# Patient Record
Sex: MALE | Race: WHITE | NOT HISPANIC OR LATINO | URBAN - NONMETROPOLITAN AREA
[De-identification: names, ages, dates, MRNs, and addresses within clinical notes are randomized per-mention and may not be internally consistent; named-entity substitution may affect disease eponyms.]

---

## 2017-05-17 ENCOUNTER — HOSPITAL ENCOUNTER (INPATIENT)
Facility: HOSPITAL | Age: 42
LOS: 6 days | Discharge: HOME | DRG: 885 | End: 2017-05-23
Attending: PSYCHIATRY & NEUROLOGY | Admitting: PSYCHIATRY & NEUROLOGY
Payer: COMMERCIAL

## 2017-05-17 ENCOUNTER — TRANSFERRED RECORDS (OUTPATIENT)
Dept: HEALTH INFORMATION MANAGEMENT | Facility: HOSPITAL | Age: 42
End: 2017-05-17

## 2017-05-17 DIAGNOSIS — I10 BENIGN ESSENTIAL HYPERTENSION: ICD-10-CM

## 2017-05-17 DIAGNOSIS — T14.91XA SUICIDE ATTEMPT (H): Primary | ICD-10-CM

## 2017-05-17 LAB
ALT SERPL-CCNC: 29 IU/L (ref 6–40)
AST SERPL-CCNC: 21 IU/L (ref 10–40)
CREAT SERPL-MCNC: 0.96 MG/DL (ref 0.7–1.2)
GLUCOSE SERPL-MCNC: 115 MG/DL (ref 70–100)
POTASSIUM SERPL-SCNC: 3.8 MEQ/L (ref 3.4–5.1)
TSH SERPL-ACNC: 0.92 UIU/ML (ref 0.4–3.99)

## 2017-05-17 PROCEDURE — 12400000 ZZH R&B MH

## 2017-05-17 PROCEDURE — 25000132 ZZH RX MED GY IP 250 OP 250 PS 637: Performed by: NURSE PRACTITIONER

## 2017-05-17 RX ORDER — ALUMINA, MAGNESIA, AND SIMETHICONE 2400; 2400; 240 MG/30ML; MG/30ML; MG/30ML
30 SUSPENSION ORAL EVERY 4 HOURS PRN
Status: DISCONTINUED | OUTPATIENT
Start: 2017-05-17 | End: 2017-05-23 | Stop reason: HOSPADM

## 2017-05-17 RX ORDER — TRAZODONE HYDROCHLORIDE 50 MG/1
50 TABLET, FILM COATED ORAL
Status: DISCONTINUED | OUTPATIENT
Start: 2017-05-17 | End: 2017-05-22

## 2017-05-17 RX ORDER — ACETAMINOPHEN 325 MG/1
650 TABLET ORAL EVERY 4 HOURS PRN
Status: DISCONTINUED | OUTPATIENT
Start: 2017-05-17 | End: 2017-05-23 | Stop reason: HOSPADM

## 2017-05-17 RX ORDER — CLONIDINE HYDROCHLORIDE 0.1 MG/1
0.1 TABLET ORAL 2 TIMES DAILY
Status: DISCONTINUED | OUTPATIENT
Start: 2017-05-17 | End: 2017-05-23 | Stop reason: HOSPADM

## 2017-05-17 RX ORDER — ONDANSETRON 4 MG/1
4 TABLET, ORALLY DISINTEGRATING ORAL EVERY 6 HOURS PRN
Status: DISCONTINUED | OUTPATIENT
Start: 2017-05-17 | End: 2017-05-23 | Stop reason: HOSPADM

## 2017-05-17 RX ORDER — HYDROXYZINE HYDROCHLORIDE 25 MG/1
25-50 TABLET, FILM COATED ORAL EVERY 4 HOURS PRN
Status: DISCONTINUED | OUTPATIENT
Start: 2017-05-17 | End: 2017-05-23 | Stop reason: HOSPADM

## 2017-05-17 RX ORDER — OLANZAPINE 10 MG/2ML
10 INJECTION, POWDER, FOR SOLUTION INTRAMUSCULAR
Status: DISCONTINUED | OUTPATIENT
Start: 2017-05-17 | End: 2017-05-23 | Stop reason: HOSPADM

## 2017-05-17 RX ORDER — LISINOPRIL 20 MG/1
20 TABLET ORAL DAILY
Status: DISCONTINUED | OUTPATIENT
Start: 2017-05-17 | End: 2017-05-23 | Stop reason: HOSPADM

## 2017-05-17 RX ORDER — BISACODYL 10 MG
10 SUPPOSITORY, RECTAL RECTAL DAILY PRN
Status: DISCONTINUED | OUTPATIENT
Start: 2017-05-17 | End: 2017-05-23 | Stop reason: HOSPADM

## 2017-05-17 RX ORDER — OLANZAPINE 10 MG/1
10 TABLET ORAL
Status: DISCONTINUED | OUTPATIENT
Start: 2017-05-17 | End: 2017-05-23 | Stop reason: HOSPADM

## 2017-05-17 RX ADMIN — LISINOPRIL 20 MG: 20 TABLET ORAL at 20:36

## 2017-05-17 RX ADMIN — OLANZAPINE 10 MG: 10 TABLET, FILM COATED ORAL at 17:51

## 2017-05-17 RX ADMIN — CLONIDINE HYDROCHLORIDE 0.1 MG: 0.1 TABLET ORAL at 20:36

## 2017-05-17 ASSESSMENT — ACTIVITIES OF DAILY LIVING (ADL)
SWALLOWING: 0-->SWALLOWS FOODS/LIQUIDS WITHOUT DIFFICULTY
CHANGE_IN_FUNCTIONAL_STATUS_SINCE_ONSET_OF_CURRENT_ILLNESS/INJURY: NO
TOILETING: 0-->INDEPENDENT
FALL_HISTORY_WITHIN_LAST_SIX_MONTHS: NO
AMBULATION: 0-->INDEPENDENT
TOILETING: 0-->INDEPENDENT
BATHING: 0-->INDEPENDENT
COGNITION: 0 - NO COGNITION ISSUES REPORTED
EATING: 0-->INDEPENDENT
RETIRED_COMMUNICATION: 0-->UNDERSTANDS/COMMUNICATES WITHOUT DIFFICULTY
AMBULATION: 0-->INDEPENDENT
DRESS: 0-->INDEPENDENT
TRANSFERRING: 0-->INDEPENDENT
COMMUNICATION: 0-->UNDERSTANDS/COMMUNICATES WITHOUT DIFFICULTY
SWALLOWING: 0-->SWALLOWS FOODS/LIQUIDS WITHOUT DIFFICULTY
PRIOR_FUNCTIONAL_LEVEL_COMMENT: NONE NOTED
TRANSFERRING: 0-->INDEPENDENT
RETIRED_EATING: 0-->INDEPENDENT
DRESS: 0-->INDEPENDENT
CURRENT_FUNCTIONAL_LEVEL_COMMENT: NONE NOTED
BATHING: 0-->INDEPENDENT

## 2017-05-17 NOTE — IP AVS SNAPSHOT
[facility]    [address] [place] [address]    Phone:  [phone]    Fax:  [phone]                                       After Visit Summary   5/17/2017    Joel Villalba    MRN: [id number]           After Visit Summary Signature Page     I have received my discharge instructions, and my questions have been answered. I have discussed any challenges I see with this plan with the nurse or doctor.    ..........................................................................................................................................  Patient/Patient Representative Signature      ..........................................................................................................................................  Patient Representative Print Name and Relationship to Patient    ..................................................               ................................................  Date                                            Time    ..........................................................................................................................................  Reviewed by Signature/Title    ...................................................              ..............................................  Date                                                            Time

## 2017-05-17 NOTE — PROGRESS NOTES
05/17/17 1731   Patient Belongings   Did you bring any home meds/supplements to the hospital?  No   Patient Belongings clothing;shoes   Disposition of Belongings sent to patient belongings   Belongings Search Yes   Clothing Search Yes   Second Staff Magaly LEMOS   General Info Comment green nike shoes, white pair socks, blue boxers, grey zip up hoodie, green under armour shorts, green under armour shirt, blue jeans

## 2017-05-17 NOTE — PROGRESS NOTES
05/17/17 1731   Patient Belongings   Did you bring any home meds/supplements to the hospital?  No   Patient Belongings clothing;shoes   Disposition of Belongings sent to patient belongings   Belongings Search Yes   Clothing Search Yes   Second Staff Magaly LEMOS   General Info Comment green nike shoes, white pair socks, blue boxers, grey zip up hoodie, green under armour shorts, green under armour shirt, blue jeans    List items sent to safe: none  All other belongings put in assigned cubby in belongings room.     I have reviewed my belongings list on admission and verify that it is correct.     Patient signature_______________________________    Second staff witness (if patient unable to sign) ______________________________       I have received all my belongings at discharge.    Patient signature________________________________    Cinthia JOINER  5/17/2017  5:35 PM

## 2017-05-17 NOTE — PLAN OF CARE
"ADMISSION NOTE    Reason for admission: Suicide Ideation.  Safety concerns: Self harm, patient reports \"laying down on the train tracks waiting for one to come\". .  Risk for or history of violence: None Noted.     Patient arrived on unit from [facility] in [place], [place] accompanied by EMT's and [place] security on 5/17/2017 at 16:55 PM.   Status on arrival: Anxious and Cooperative.   BP (!) 148/107  Pulse 102  Temp 97.3  F (36.3  C) (Tympanic)  Resp 16  Ht 1.702 m (5' 7\")  Wt 97 kg (213 lb 12.8 oz)  BMI 33.49 kg/m2  Patient given tour of unit and Welcome to  unit papers given to patient, wanding completed, belongings inventoried, and admission assessment initiated.   Patient's legal status on arrival is 72 hour hold. Appropriate legal rights discussed with and copy given to patient. Patient Bill of Rights discussed with and copy given to patient.   Patient denies SI, HI, and thoughts of self harm and contracts for safety while on unit. Full skin assessment completed.    Porsha Harrison  5/17/2017  5:40 PM      "

## 2017-05-17 NOTE — IP AVS SNAPSHOT
MRN:[id number]                      After Visit Summary   5/17/2017    Jeol Villalba    MRN: [id number]           Thank you!     Thank you for choosing [facility] for your care. Our goal is always to provide you with excellent care. Hearing back from our patients is one way we can continue to improve our services. Please take a few minutes to complete the written survey that you may receive in the mail after you visit with us. Thank you!        Patient Information     Date Of Birth          1975        Designated Caregiver       Most Recent Value    Caregiver    Will someone help with your care after discharge? no      About your hospital stay     You were admitted on:  May 17, 2017 You last received care in the:  [facility]    You were discharged on:  May 23, 2017       Who to Call     For medical emergencies, please call 911.  For non-urgent questions about your medical care, please call your primary care provider or clinic, [phone]          Attending Provider     Provider Specialty    Ethan Coats MD Psychiatry    Mundo Hassan NP Licensed Mental [facility]       Primary Care Provider Office Phone # Fax #    Casey Ethan Ellis [phone]       [facility] [address] [place] [address]        Further instructions from your care team       Behavioral Discharge Planning and Instructions    Summary: Joel was admitted to  with suicidal ideation     Main Diagnosis: Major depressive disorder with psychotic features      Major Treatments, Procedures and Findings: Stabilize with medications, connect with community programs.    Symptoms to Report: feeling more aggressive, increased confusion, losing more sleep, mood getting worse or thoughts of suicide    Lifestyle Adjustment: Take all medications as prescribed, meet with doctor/ medication provider, out patient therapist, , and [facility] worker as scheduled. Abstain from alcohol  or any unprescribed drugs.    Psychiatry Follow-up:     [facility]  PCP- Casey Ellis -  June 1st @ 10:30   [address] [place] [address]  Phone: [phone]  Fax: [phone]    [facility]   Therapy - Elham Kohler -  June 5th @ 1:30 (bring insurance card and photo ID)   [facility] - referral faxed on 5/19/2017  [address],   [place] [address]   Phone: [phone]  Fax: [phone]    [facility]  Case management - referral faxed on 5/18/2017   Phone: [phone]  Fax: [phone]    Resources:   Crisis Intervention: 835.248.1668 or 935-931-9032 (TTY: 833.436.4201).  Call anytime for help.  National [place] on [place]ness (www.[place].isaías.org): 191.135.8915 or 802-958-8982.  Alcoholics Anonymous (www.alcoholics-anonymous.org): Check your phone book for your local chapter.  Suicide Awareness Voices of Education (SAVE) (www.save.org): 488-671-EWWI (2675)  National Suicide Prevention Line (www.mentalhealthmn.org): 284-888-CGZK (1793)  Mental Health Consumer/Survivor Network of [place] (www.mhcsn.net): 955.671.9349 or 320-836-7270  Mental Health Association of [place] (www.mentalhealth.org): 659.531.3536 or 839-149-1027    General Medication Instructions:   See your medication sheet(s) for instructions.   Take all medicines as directed.  Make no changes unless your doctor suggests them.   Go to all your doctor visits.  Be sure to have all your required lab tests. This way, your medicines can be refilled on time.  Do not use any drugs not prescribed by your doctor.  Avoid alcohol.    [place] Area:  [facility], [facility] stabilization [facility]- [phone]  formerly [facility] Crisis Line: 1-494.956.2787  Advocates For Family Peace: 052-8661  Sexual Assault Program [facility]: [phone] or 4-740-740-2325  [place] Battered Women's Program: 9-481-992-5099 Ext: 279       Calls answered Mon-Fri-8:00 am--4:30 pm    [place]:  Advocates for Family Peace: 4-583-990-2141  [place]  "[facility] first call for help: 4-495-017-9652  [facility] Crisis [facility]:  (761) [phone]      [place] Area:  Warm Line: 1-737.929.4221       Calls answered Tuesday--Saturday 4:00 pm--10:00 pm  [facility] Crisis Line - [phone]  [facility] [phone]    [place] Statewide:  [place] Crisis and Referral Services: 1-369.536.6753  National Suicide Prevention Lifeline: 8-620-656-MEGB (8720)   - uem6jyru- Text  Life  to [address]  First Call for Help:   [facility] Helpline- 5-354-BNMQ-HELP      Pending Results     No orders found from 5/15/2017 to 2017.            Statement of Approval     Ordered          17  I have reviewed and agree with all the recommendations and orders detailed in this document.  EFFECTIVE NOW     Approved and electronically signed by:  Thea Mcfadden NP             Admission Information     Date & Time Provider Department Dept. Phone    2017 Mundo Hassan NP [facility] [phone]      Your Vitals Were     Blood Pressure Pulse Temperature Respirations Height Weight    160/86 88 97.2  F (36.2  C) (Tympanic) 15 1.676 m (5' 6\") 97.7 kg (215 lb 4.8 oz)    Pulse Oximetry BMI (Body Mass Index)                93% 34.75 kg/m2          MyChart Information     [facility] lets you send messages to your doctor, view your test results, renew your prescriptions, schedule appointments and more. To sign up, go to [email or web address] . Click on \"Log in\" on the left side of the screen, which will take you to the Welcome page. Then click on \"Sign up Now\" on the right side of the page.     You will be asked to enter the access code listed below, as well as some personal information. Please follow the directions to create your username and password.     Your access code is: [email or web address]  Expires: 2017  8:57 AM     Your access code will  in 90 days. If you need help or a new code, please call your [place] clinic or [phone].        Care " EveryWhere ID     This is your Care EveryWhere ID. This could be used by other organizations to access your [facility] medical records  [id number]           Review of your medicines      START taking        Dose / Directions    celecoxib 100 MG capsule   Commonly known as:  celeBREX        Dose:  100 mg   Take 1 capsule (100 mg) by mouth 2 times daily   Quantity:  60 capsule   Refills:  0       divalproex 500 MG 24 hr tablet   Commonly known as:  DEPAKOTE ER        Dose:  1000 mg   Take 2 tablets (1,000 mg) by mouth At Bedtime   Quantity:  60 tablet   Refills:  0       hydrOXYzine 25 MG tablet   Commonly known as:  ATARAX        Dose:  25-50 mg   Take 1-2 tablets (25-50 mg) by mouth every 4 hours as needed for anxiety   Quantity:  120 tablet   Refills:  0       metaxalone 800 MG tablet   Commonly known as:  SKELAXIN        Dose:  800 mg   Take 1 tablet (800 mg) by mouth 3 times daily   Quantity:  90 tablet   Refills:  0       risperiDONE 1 MG tablet   Commonly known as:  risperDAL        Dose:  1 mg   Take 1 tablet (1 mg) by mouth At Bedtime   Quantity:  30 tablet   Refills:  0         CONTINUE these medicines which may have CHANGED, or have new prescriptions. If we are uncertain of the size of tablets/capsules you have at home, strength may be listed as something that might have changed.        Dose / Directions    cloNIDine 0.1 MG tablet   Commonly known as:  CATAPRES   This may have changed:  medication strength        Dose:  0.1 mg   Take 1 tablet (0.1 mg) by mouth 2 times daily   Quantity:  60 tablet   Refills:  0       lisinopril 20 MG tablet   Commonly known as:  PRINIVIL/ZESTRIL   This may have changed:  medication strength   Used for:  Benign essential hypertension        Dose:  20 mg   Take 1 tablet (20 mg) by mouth daily   Quantity:  30 tablet   Refills:  0         CONTINUE these medicines which have NOT CHANGED        Dose / Directions    fluticasone 27.5 MCG/SPRAY spray   Commonly known as:  VERAMYST         Dose:  2 spray   Spray 2 sprays into both nostrils daily   Refills:  0       MECLIZINE HCL PO        Dose:  25 mg   Take 25 mg by mouth 3 times daily as needed for dizziness   Refills:  0       MELATONIN PO        Dose:  5 mg   Take 5 mg by mouth At Bedtime   Refills:  0       OMEPRAZOLE PO        Dose:  20 mg   Take 20 mg by mouth daily   Refills:  0         STOP taking     CYMBALTA PO           NAPROXEN PO           TRAZODONE HCL PO                Where to get your medicines      These medications were sent to [facility] - [facility] - [address]  [address] [place] [address]     Phone:  [phone]     celecoxib 100 MG capsule    cloNIDine 0.1 MG tablet    divalproex 500 MG 24 hr tablet    hydrOXYzine 25 MG tablet    lisinopril 20 MG tablet    metaxalone 800 MG tablet    risperiDONE 1 MG tablet                Protect others around you: Learn how to safely use, store and throw away your medicines at www.disposemymeds.org.             Medication List: This is a list of all your medications and when to take them. Check marks below indicate your daily home schedule. Keep this list as a reference.      Medications           Morning Afternoon Evening Bedtime As Needed    celecoxib 100 MG capsule   Commonly known as:  celeBREX   Take 1 capsule (100 mg) by mouth 2 times daily   Last time this was given:  100 mg on 5/23/2017  8:33 AM                                cloNIDine 0.1 MG tablet   Commonly known as:  CATAPRES   Take 1 tablet (0.1 mg) by mouth 2 times daily   Last time this was given:  0.1 mg on 5/23/2017  8:33 AM                                divalproex 500 MG 24 hr tablet   Commonly known as:  DEPAKOTE ER   Take 2 tablets (1,000 mg) by mouth At Bedtime   Last time this was given:  1,000 mg on 5/22/2017  8:22 PM                                fluticasone 27.5 MCG/SPRAY spray   Commonly known as:  VERAMYST   Spray 2 sprays into both nostrils daily                                 hydrOXYzine 25 MG tablet   Commonly known as:  ATARAX   Take 1-2 tablets (25-50 mg) by mouth every 4 hours as needed for anxiety                                lisinopril 20 MG tablet   Commonly known as:  PRINIVIL/ZESTRIL   Take 1 tablet (20 mg) by mouth daily   Last time this was given:  20 mg on 5/23/2017  8:33 AM                                MECLIZINE HCL PO   Take 25 mg by mouth 3 times daily as needed for dizziness                                MELATONIN PO   Take 5 mg by mouth At Bedtime                                metaxalone 800 MG tablet   Commonly known as:  SKELAXIN   Take 1 tablet (800 mg) by mouth 3 times daily   Last time this was given:  800 mg on 5/23/2017  8:33 AM                                OMEPRAZOLE PO   Take 20 mg by mouth daily   Last time this was given:  20 mg on 5/23/2017  6:46 AM                                risperiDONE 1 MG tablet   Commonly known as:  risperDAL   Take 1 tablet (1 mg) by mouth At Bedtime   Last time this was given:  1 mg on 5/22/2017  8:22 PM

## 2017-05-18 PROCEDURE — 25000132 ZZH RX MED GY IP 250 OP 250 PS 637: Performed by: NURSE PRACTITIONER

## 2017-05-18 PROCEDURE — 12400000 ZZH R&B MH

## 2017-05-18 PROCEDURE — 99223 1ST HOSP IP/OBS HIGH 75: CPT | Performed by: NURSE PRACTITIONER

## 2017-05-18 RX ORDER — FLUTICASONE PROPIONATE 50 MCG
2 SPRAY, SUSPENSION (ML) NASAL DAILY
Status: DISCONTINUED | OUTPATIENT
Start: 2017-05-18 | End: 2017-05-23 | Stop reason: HOSPADM

## 2017-05-18 RX ORDER — METAXALONE 800 MG/1
800 TABLET ORAL 3 TIMES DAILY
Status: DISCONTINUED | OUTPATIENT
Start: 2017-05-18 | End: 2017-05-23 | Stop reason: HOSPADM

## 2017-05-18 RX ORDER — CELECOXIB 100 MG/1
100 CAPSULE ORAL 2 TIMES DAILY
Status: DISCONTINUED | OUTPATIENT
Start: 2017-05-18 | End: 2017-05-23 | Stop reason: HOSPADM

## 2017-05-18 RX ORDER — MECLIZINE HYDROCHLORIDE 25 MG/1
25 TABLET ORAL 3 TIMES DAILY PRN
Status: DISCONTINUED | OUTPATIENT
Start: 2017-05-18 | End: 2017-05-23 | Stop reason: HOSPADM

## 2017-05-18 RX ORDER — LIDOCAINE 50 MG/G
2 PATCH TOPICAL EVERY 24 HOURS
Status: DISCONTINUED | OUTPATIENT
Start: 2017-05-18 | End: 2017-05-23 | Stop reason: HOSPADM

## 2017-05-18 RX ORDER — DULOXETIN HYDROCHLORIDE 60 MG/1
60 CAPSULE, DELAYED RELEASE ORAL 2 TIMES DAILY
Status: DISCONTINUED | OUTPATIENT
Start: 2017-05-18 | End: 2017-05-19

## 2017-05-18 RX ORDER — RISPERIDONE 1 MG/1
1 TABLET ORAL AT BEDTIME
Status: DISCONTINUED | OUTPATIENT
Start: 2017-05-18 | End: 2017-05-23 | Stop reason: HOSPADM

## 2017-05-18 RX ADMIN — LIDOCAINE 2 PATCH: 50 PATCH TOPICAL at 12:05

## 2017-05-18 RX ADMIN — METAXALONE 800 MG: 800 TABLET ORAL at 13:39

## 2017-05-18 RX ADMIN — CELECOXIB 100 MG: 100 CAPSULE ORAL at 20:31

## 2017-05-18 RX ADMIN — DULOXETINE HYDROCHLORIDE 60 MG: 60 CAPSULE, DELAYED RELEASE ORAL at 10:49

## 2017-05-18 RX ADMIN — METAXALONE 800 MG: 800 TABLET ORAL at 10:49

## 2017-05-18 RX ADMIN — LISINOPRIL 20 MG: 20 TABLET ORAL at 08:03

## 2017-05-18 RX ADMIN — OLANZAPINE 10 MG: 10 TABLET, FILM COATED ORAL at 09:21

## 2017-05-18 RX ADMIN — CLONIDINE HYDROCHLORIDE 0.1 MG: 0.1 TABLET ORAL at 08:03

## 2017-05-18 RX ADMIN — RISPERIDONE 1 MG: 1 TABLET ORAL at 20:31

## 2017-05-18 RX ADMIN — CLONIDINE HYDROCHLORIDE 0.1 MG: 0.1 TABLET ORAL at 20:31

## 2017-05-18 RX ADMIN — METAXALONE 800 MG: 800 TABLET ORAL at 20:31

## 2017-05-18 RX ADMIN — OMEPRAZOLE 20 MG: 20 CAPSULE, DELAYED RELEASE ORAL at 10:49

## 2017-05-18 RX ADMIN — DULOXETINE HYDROCHLORIDE 60 MG: 60 CAPSULE, DELAYED RELEASE ORAL at 20:31

## 2017-05-18 RX ADMIN — CELECOXIB 100 MG: 100 CAPSULE ORAL at 12:04

## 2017-05-18 ASSESSMENT — ACTIVITIES OF DAILY LIVING (ADL)
ORAL_HYGIENE: INDEPENDENT
GROOMING: INDEPENDENT
DRESS: INDEPENDENT
ORAL_HYGIENE: INDEPENDENT
DRESS: INDEPENDENT;SCRUBS (BEHAVIORAL HEALTH)
GROOMING: INDEPENDENT
LAUNDRY: UNABLE TO COMPLETE
LAUNDRY: UNABLE TO COMPLETE

## 2017-05-18 NOTE — PLAN OF CARE
Face to face end of shift report received from CHELSY Neal. Rounding completed. Patient observed.     Niya Rhoades  5/18/2017  6:40 PM
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
## 2017-05-18 NOTE — PLAN OF CARE
Face to face end of shift report received from Aiden MANCILLA RN. Rounding completed. Patient observed in room laying in bed with eyes closed.     Val Grant  5/18/2017  7:40 AM
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
## 2017-05-18 NOTE — PLAN OF CARE
Face to face end of shift report received from Porsha RUTH RN. Rounding completed. Patient observed resting in bed.     Aiden Pittman  5/18/2017  12:41 AM

## 2017-05-18 NOTE — PLAN OF CARE
Problem: Goal Outcome Summary  Goal: Goal Outcome Summary  Outcome: Improving  Pt has been in bed with eyes closed and regular respirations observed all night. Will continue to monitor.
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
## 2017-05-19 PROCEDURE — 25000132 ZZH RX MED GY IP 250 OP 250 PS 637: Performed by: NURSE PRACTITIONER

## 2017-05-19 PROCEDURE — 99233 SBSQ HOSP IP/OBS HIGH 50: CPT | Performed by: NURSE PRACTITIONER

## 2017-05-19 PROCEDURE — 12400000 ZZH R&B MH

## 2017-05-19 RX ORDER — DIVALPROEX SODIUM 500 MG/1
500 TABLET, EXTENDED RELEASE ORAL AT BEDTIME
Status: COMPLETED | OUTPATIENT
Start: 2017-05-19 | End: 2017-05-19

## 2017-05-19 RX ORDER — DULOXETIN HYDROCHLORIDE 60 MG/1
60 CAPSULE, DELAYED RELEASE ORAL DAILY
Status: COMPLETED | OUTPATIENT
Start: 2017-05-20 | End: 2017-05-22

## 2017-05-19 RX ADMIN — CLONIDINE HYDROCHLORIDE 0.1 MG: 0.1 TABLET ORAL at 08:36

## 2017-05-19 RX ADMIN — LISINOPRIL 20 MG: 20 TABLET ORAL at 08:36

## 2017-05-19 RX ADMIN — LIDOCAINE 2 PATCH: 50 PATCH TOPICAL at 10:00

## 2017-05-19 RX ADMIN — CELECOXIB 100 MG: 100 CAPSULE ORAL at 20:57

## 2017-05-19 RX ADMIN — NICOTINE POLACRILEX 4 MG: 4 GUM, CHEWING ORAL at 08:42

## 2017-05-19 RX ADMIN — CELECOXIB 100 MG: 100 CAPSULE ORAL at 08:36

## 2017-05-19 RX ADMIN — RISPERIDONE 1 MG: 1 TABLET ORAL at 20:53

## 2017-05-19 RX ADMIN — CLONIDINE HYDROCHLORIDE 0.1 MG: 0.1 TABLET ORAL at 20:53

## 2017-05-19 RX ADMIN — METAXALONE 800 MG: 800 TABLET ORAL at 13:08

## 2017-05-19 RX ADMIN — DIVALPROEX SODIUM 500 MG: 500 TABLET, EXTENDED RELEASE ORAL at 20:52

## 2017-05-19 RX ADMIN — OMEPRAZOLE 20 MG: 20 CAPSULE, DELAYED RELEASE ORAL at 06:33

## 2017-05-19 RX ADMIN — METAXALONE 800 MG: 800 TABLET ORAL at 20:53

## 2017-05-19 RX ADMIN — ACETAMINOPHEN 650 MG: 325 TABLET, FILM COATED ORAL at 06:37

## 2017-05-19 RX ADMIN — DULOXETINE HYDROCHLORIDE 60 MG: 60 CAPSULE, DELAYED RELEASE ORAL at 08:36

## 2017-05-19 RX ADMIN — METAXALONE 800 MG: 800 TABLET ORAL at 08:36

## 2017-05-19 ASSESSMENT — ACTIVITIES OF DAILY LIVING (ADL)
LAUNDRY: UNABLE TO COMPLETE
ORAL_HYGIENE: INDEPENDENT
DRESS: INDEPENDENT;SCRUBS (BEHAVIORAL HEALTH)
ORAL_HYGIENE: INDEPENDENT
GROOMING: INDEPENDENT
GROOMING: INDEPENDENT
DRESS: INDEPENDENT;SCRUBS (BEHAVIORAL HEALTH)
LAUNDRY: UNABLE TO COMPLETE

## 2017-05-19 NOTE — PLAN OF CARE
Face to face end of shift report received from CHELSY Neal. Rounding completed. Patient observed in [facility].     Niya Rhoades  5/19/2017  4:20 PM

## 2017-05-19 NOTE — PLAN OF CARE
"Problem: Goal Outcome Summary  Goal: Goal Outcome Summary  Outcome: Improving  Pt up in the lounge this morning upon initial assessment. Pt states he slept \"OK, tossing and turning, up and down, throughout the night d/t back pain.\" Pt continues to report depression 7/10, and anxiety 2/10. Pt states he is able to cope with anxiety at this time sitting in room relaxing. Pt did get up for breakfast then back in bed laying down. Pt will be encouraged to attend groups. Pt does maintain boundaries and eye contact throughout assessment. PT also maintains boundaries with peers.     Problem: Depression (Adult,Obstetrics,Pediatric)  Goal: Establish/Maintain Self-Care Routine  Patient will attend at least 50% of groups while on the unit.   Patient will be independent in daily ADL s.   Outcome: Improving  Pt completes ADL's independently at this time, pt is not attending groups thus far this morning.  Goal: Improved/Stable Mood  Patient will be compliant with treatment team recommendations.   Patient will verbalize decrease in depression prior to discharge.   Outcome: Improving  Pt compliant with treatment team taking medications prescribed but does not attend groups at this time. Pt continues to report depression 7/10    Problem: Anxiety (Adult)  Goal: Reduction/Resolution  Patient will come to staff if needing PRN medications for anxiety.   Patient will verbalize at least 2 effective coping mechanisms prior to discharge.   Patient will verbalize decrease in anxiety prior to discharge.   Outcome: Improving  Pt does state when he would like a PRN for anxiety, Pt currently states his anxiety is a 2/10 and is currently controllable without medications    Problem: Suicide Risk (Adult)  Goal: Strength-Based Wellness/Recovery  Patient will attend at least 50% of groups while on unit.   Outcome: No Change  Pt up for breakfast then back in bed after taking morning medications.   Goal: Physical Safety  Patient will contract for safety " while on unit if having thoughts of self harm.   Patient will report absence of suicidal ideations prior to discharge.   Outcome: Improving  Pt contracts for safety, pt is free from self harm. Pt denies SI at this time.

## 2017-05-19 NOTE — PLAN OF CARE
"Problem: Discharge Planning  Goal: Discharge Planning (Adult, OB, Behavioral, Peds)  Outcome: No Change  Spoke with pt this afternoon, he was laying in bed. States he was doing okay until change of shift when it \"sounded like [place].\" Denies SI. States he just wants to be out and riding on his motorcycle. Is worried about the bill he is gong to get from having his truck towed. Pt asked for 's number yesterday so gave this to pt today- he states \"I didn't want want the number I want someone to call and explain my case with my brother.\" Informed pt he would need to make this call and explain his own case- he then states he already did this awhile ago and they are reviewing it. No other questions or concerns at this time.       "

## 2017-05-19 NOTE — PLAN OF CARE
Problem: Goal Outcome Summary  Goal: Goal Outcome Summary  Outcome: Improving  Pt has been in bed with eyes closed and regular respirations observed all night. Will continue to monitor.  0650- Pt received PRN tylenol for knee and lower back pain pain rated at a 5 out of 10.

## 2017-05-19 NOTE — PROGRESS NOTES
"[facility]  Psychiatric Progress Note      Impression:   This is my first time meeting with Joel. H&P was reviewed. He states he slept 8 hours last night though he is extremely pressured. He interrupts me through most of the conversation. He states that he has questioned if he is bipolar and states \"my girlfriend tells me that i get very manic.\" he started hearing voices last year. They are always negative. Laughing at him. He has had VH. He states that he hasnt had any AH or VH since yesterday. He states  \"they occur more when i get a bit tired\". They do not occur when he is falling asleep. His mother had a diagnosis of schizophrenia though when he describes how she presented, does sound like there was a significnat mood component. No suicides in family. He has never been on a mood stabilizer. Doesn't feel like cymbalta has helped him much. risperdal was started last night.     Educated regarding medication indications, risks, benefits, side effects, contraindications and possible interactions. Verbally expressed understanding.        DIagnoses:     Bipolar I most recent episode, mixed    Attestation:  Patient has been seen and evaluated by me,  Thea Mcfadden NP          Interim History:   The patient's care was discussed with the treatment team and chart notes were reviewed.          Medications:       lidocaine  2 patch Transdermal Q24H     metaxalone  800 mg Oral TID     celecoxib  100 mg Oral BID     risperiDONE  1 mg Oral At Bedtime     DULoxetine (CYMBALTA) EC capsule 60 mg  60 mg Oral BID     fluticasone  2 spray Both Nostrils Daily     omeprazole (priLOSEC) CR capsule 20 mg  20 mg Oral QAM AC     lidocaine   Transdermal Q24H     lidocaine   Transdermal Q8H     cloNIDine (CATAPRES) tablet 0.1 mg  0.1 mg Oral BID     lisinopril (PRINIVIL/ZESTRIL) tablet 20 mg  20 mg Oral Daily              10 point ROS negative        Allergies:     Allergies   Allergen Reactions     Seasonal " "Allergies      Wellbutrin [Bupropion] Hives and Swelling            Psychiatric Examination:   /85  Pulse 80  Temp 96.5  F (35.8  C) (Tympanic)  Resp 18  Ht 1.702 m (5' 7\")  Wt 97 kg (213 lb 12.8 oz)  SpO2 96%  BMI 33.49 kg/m2  Weight is 213 lbs 12.8 oz  Body mass index is 33.49 kg/(m^2).    Appearance:  awake, alert and adequately groomed  Attitude:  cooperative  Eye Contact:  intense  Mood:  anxious exaggerated  Affect:  intensity is exaggerated, intensity is heightened and intensity is dramatic  Speech:  pressured speech  Psychomotor Behavior:  no evidence of tardive dyskinesia, dystonia, or tics sits very close and very intense  Thought Process:  circumstantial  Associations:  no loose associations  Thought Content:  auditory hallucinations present, visual hallucinations present and obsessions present  Insight:  limited  Judgment:  limited  Oriented to:  time, person, and place  Attention Span and Concentration:  limited  Recent and Remote Memory:  limited  Fund of Knowledge: appropriate  Muscle Strength and Tone: normal  Gait and Station: Normal           Labs:   No results found for this or any previous visit.             Plan:   Lower cymbalta 60 mg daily  For 2 days then sotp    Start depakote  mg daily for one day then increase to 750        "

## 2017-05-19 NOTE — DISCHARGE INSTRUCTIONS
Behavioral Discharge Planning and Instructions    Summary: Joel was admitted to  with suicidal ideation     Main Diagnosis: Major depressive disorder with psychotic features      Major Treatments, Procedures and Findings: Stabilize with medications, connect with community programs.    Symptoms to Report: feeling more aggressive, increased confusion, losing more sleep, mood getting worse or thoughts of suicide    Lifestyle Adjustment: Take all medications as prescribed, meet with doctor/ medication provider, out patient therapist, , and [facility] worker as scheduled. Abstain from alcohol or any unprescribed drugs.    Psychiatry Follow-up:     [facility]  PCP- Casey Ellis -  June 1st @ 10:30   [address] [place] [address]  Phone: [phone]  Fax: [phone]    [facility]   Therapy - Elham Jose F -  June 5th @ 1:30 (bring insurance card and photo ID)   [facility] - referral faxed on 5/19/2017  [address],   [place] [address]   Phone: [phone]  Fax: [phone]    [facility]  Case management - referral faxed on 5/18/2017   Phone: [phone]  Fax: [phone]    Resources:   Crisis Intervention: 803.540.6671 or 097-087-3843 (TTY: 674.928.7236).  Call anytime for help.  National [place] on Mental Illness (www.[place].isaías.org): 584.886.6082 or 324-444-6694.  Alcoholics Anonymous (www.alcoholics-anonymous.org): Check your phone book for your local chapter.  Suicide Awareness Voices of Education (SAVE) (www.save.org): 125-796-PFXE (7383)  National Suicide Prevention Line (www.mentalhealthmn.org): 141-285-LNIZ (3513)  Mental Health Consumer/Survivor Network of [place] (www.mhcsn.net): 710.353.3373 or 016-074-5062  Mental Health Association of [place] (www.mentalhealth.org): 998.155.8550 or 864-777-5455    General Medication Instructions:   See your medication sheet(s) for instructions.   Take all medicines as directed.  Make no changes unless your doctor suggests  them.   Go to all your doctor visits.  Be sure to have all your required lab tests. This way, your medicines can be refilled on time.  Do not use any drugs not prescribed by your doctor.  Avoid alcohol.    [place] Area:  [facility], Crisis stabilization [facility]- [phone]  [facility] Crisis Line: 1-230.625.6082  Advocates For Family Peace: 286-0393  [facility]ual Assault Program [facility]: 640.374.3969 or 1-895.247.2956  [place] [facility]attered Women's Program: 1-111.208.1935 Ext: 279       Calls answered Mon-Fri-8:00 am--4:30 pm    [place]:  [facility]ates for Family Peace: 1-768.232.3845  [facility] first call for help: 1-964.701.3436  [facility]:  (806) 400-9334      [place] Area:  Warm Line: 1-428.278.6009       Calls answered Tuesday--Saturday 4:00 pm--10:00 pm  [facility] Crisis Line - [phone]  [place] [facility] 209-199-5827    [place] Statewide:  [place] Crisis and Referral Services: 1-582.521.9123  National Suicide Prevention Lifeline: 2-660-869-TALK (5110)   - wid0pifz- Text  Life  to 51775  First Call for Help: 2-1-1  [facility] Helpline- 8-934-MXKH-HELP

## 2017-05-19 NOTE — PLAN OF CARE
"Problem: Goal Outcome Summary  Goal: Goal Outcome Summary  Pt voices frustration about having to miss is appointment today for septum surgery. Pt says if they would of just put him in the hospital where his surgery was he could of went down had the surgery and went back to the floor. Pt says he is tired all time. States, \"I have sleep apnea and don't get enough oxygen at night.\" says the left nostril is nearly closed. Pt denies SI, HI, and hallucinations. He was resting in his room beginning of this shift but has been up on the unit and attending groups since dinner. Pt denies anxiety. Says he is still feeling pretty depressed. Pt is calm and cooperative. Affect is flat. Pt is complaint with prescribed medications.     Problem: Depression (Adult,Obstetrics,Pediatric)  Goal: Establish/Maintain Self-Care Routine  Patient will attend at least 50% of groups while on the unit.   Patient will be independent in daily ADL s.   Outcome: Therapy, progress toward functional goals is gradual  Pt has been attending groups this shift.  Pt is independent with ADLs  Goal: Improved/Stable Mood  Patient will be compliant with treatment team recommendations.   Patient will verbalize decrease in depression prior to discharge.   Outcome: Therapy, progress toward functional goals is gradual  Pt is complaint with treatment recommendations.  Pt says depression is still pretty high    Problem: Anxiety (Adult)  Goal: Reduction/Resolution  Patient will come to staff if needing PRN medications for anxiety.   Patient will verbalize at least 2 effective coping mechanisms prior to discharge.   Patient will verbalize decrease in anxiety prior to discharge.   Outcome: Therapy, unable to show any progress toward functional goals  Pt denies anxiety.     Problem: Suicide Risk (Adult)  Goal: Strength-Based Wellness/Recovery  Patient will attend at least 50% of groups while on unit.   Outcome: Therapy, progress toward functional goals is gradual  Pt has " been attending groups  Goal: Physical Safety  Patient will contract for safety while on unit if having thoughts of self harm.   Patient will report absence of suicidal ideations prior to discharge.   Outcome: Therapy, progress toward functional goals is gradual  Pt contracts for safety.   Pt denies SI

## 2017-05-19 NOTE — PLAN OF CARE
Face to face end of shift report received from Aiden MANCILLA RN. Rounding completed. Patient observed in unge talking with peer.     Val Grant  5/19/2017  7:57 AM

## 2017-05-20 LAB — GLUCOSE BLDC GLUCOMTR-MCNC: 120 MG/DL (ref 70–99)

## 2017-05-20 PROCEDURE — 25000132 ZZH RX MED GY IP 250 OP 250 PS 637: Performed by: NURSE PRACTITIONER

## 2017-05-20 PROCEDURE — 25000132 ZZH RX MED GY IP 250 OP 250 PS 637

## 2017-05-20 PROCEDURE — 00000146 ZZHCL STATISTIC GLUCOSE BY METER IP

## 2017-05-20 PROCEDURE — 99232 SBSQ HOSP IP/OBS MODERATE 35: CPT | Performed by: NURSE PRACTITIONER

## 2017-05-20 PROCEDURE — 12400000 ZZH R&B MH

## 2017-05-20 RX ORDER — NICOTINE 21 MG/24HR
1 PATCH, TRANSDERMAL 24 HOURS TRANSDERMAL DAILY
Status: DISCONTINUED | OUTPATIENT
Start: 2017-05-20 | End: 2017-05-23 | Stop reason: HOSPADM

## 2017-05-20 RX ORDER — CELECOXIB 100 MG/1
CAPSULE ORAL
Status: COMPLETED
Start: 2017-05-20 | End: 2017-05-20

## 2017-05-20 RX ADMIN — CELECOXIB 100 MG: 100 CAPSULE ORAL at 21:05

## 2017-05-20 RX ADMIN — METAXALONE 800 MG: 800 TABLET ORAL at 08:29

## 2017-05-20 RX ADMIN — METAXALONE 800 MG: 800 TABLET ORAL at 14:18

## 2017-05-20 RX ADMIN — LIDOCAINE 2 PATCH: 50 PATCH TOPICAL at 11:15

## 2017-05-20 RX ADMIN — CLONIDINE HYDROCHLORIDE 0.1 MG: 0.1 TABLET ORAL at 08:29

## 2017-05-20 RX ADMIN — DIVALPROEX SODIUM 750 MG: 250 TABLET, FILM COATED, EXTENDED RELEASE ORAL at 20:52

## 2017-05-20 RX ADMIN — NICOTINE 1 PATCH: 14 PATCH, EXTENDED RELEASE TRANSDERMAL at 16:06

## 2017-05-20 RX ADMIN — LISINOPRIL 20 MG: 20 TABLET ORAL at 08:29

## 2017-05-20 RX ADMIN — CLONIDINE HYDROCHLORIDE 0.1 MG: 0.1 TABLET ORAL at 20:53

## 2017-05-20 RX ADMIN — OMEPRAZOLE 20 MG: 20 CAPSULE, DELAYED RELEASE ORAL at 08:31

## 2017-05-20 RX ADMIN — METAXALONE 800 MG: 800 TABLET ORAL at 20:54

## 2017-05-20 RX ADMIN — DULOXETINE HYDROCHLORIDE 60 MG: 60 CAPSULE, DELAYED RELEASE ORAL at 08:28

## 2017-05-20 RX ADMIN — RISPERIDONE 1 MG: 1 TABLET ORAL at 20:54

## 2017-05-20 RX ADMIN — CELECOXIB 100 MG: 100 CAPSULE ORAL at 08:28

## 2017-05-20 RX ADMIN — NICOTINE POLACRILEX 4 MG: 4 GUM, CHEWING ORAL at 14:21

## 2017-05-20 RX ADMIN — TRAZODONE HYDROCHLORIDE 50 MG: 50 TABLET ORAL at 20:54

## 2017-05-20 ASSESSMENT — ACTIVITIES OF DAILY LIVING (ADL)
DRESS: INDEPENDENT
GROOMING: INDEPENDENT
ORAL_HYGIENE: INDEPENDENT
LAUNDRY: UNABLE TO COMPLETE

## 2017-05-20 NOTE — PLAN OF CARE
Face to face end of shift report received from Nancie ISAACS RN. Rounding completed. Patient observed in bed, awake.     Apryl Thomas  5/20/2017  7:51 AM

## 2017-05-20 NOTE — PLAN OF CARE
"Problem: Goal Outcome Summary  Goal: Goal Outcome Summary  Pt denies SI, HI, and hallucinations. Says he is a little anxious waiting to hear where his truck ended up at. Pt states, \"I know its going to cost me a ton of money to get my truck back and I hardly have any of my settlement money left.\" Pt talks about his brother owing him over 20 grand and says he will never see it. 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 Declines medication intervention at this time. Pt says depression is still \"relatively high.\" Pt is complaint with medication.      Problem: Depression (Adult,Obstetrics,Pediatric)  Goal: Establish/Maintain Self-Care Routine  Patient will attend at least 50% of groups while on the unit.   Patient will be independent in daily ADL s.   Outcome: Therapy, progress toward functional goals is gradual  Pt is independent with ADLs      Goal: Improved/Stable Mood  Patient will be compliant with treatment team recommendations.   Patient will verbalize decrease in depression prior to discharge.   Outcome: Therapy, progress toward functional goals is gradual  Pt is compliant with treatment team recommendations.         Problem: Anxiety (Adult)  Goal: Reduction/Resolution  Patient will come to staff if needing PRN medications for anxiety.   Patient will verbalize at least 2 effective coping mechanisms prior to discharge.   Patient will verbalize decrease in anxiety prior to discharge.   Outcome: No Change  Pt says depression is still relatively high      "

## 2017-05-20 NOTE — PROGRESS NOTES
"[facility]  Psychiatric Progress Note    Subjective   This is a 41 year old male with bipolar 1 disorder recently started on Depakote for mood stabilization. Pt rambling, pressured, somewhat difficult to redirect. Reports feeling stressed over \"all the stuff I need to do at discharge\". He feels over whelmed by all he needs to do, encouraged to consider case management. Given options for pain control which were cupping, alpha stim, and EMDR, he is willing to look into all of these as he does not want to take medication. Pt believes the depakote is doing more for him than anything else.         DIagnoses:    Bipolar 1, most recent episode, mixed.  Attestation:  Patient has been seen and evaluated by me, Lisa Herr, JENNY DOMINIQUE, in the presence of the house staff team          Interim History:   The patient's care was discussed with the treatment team and chart notes were reviewed.          Medications:       DULoxetine (CYMBALTA) EC capsule 60 mg  60 mg Oral Daily     divalproex  750 mg Oral At Bedtime     lidocaine  2 patch Transdermal Q24H     metaxalone  800 mg Oral TID     celecoxib  100 mg Oral BID     risperiDONE  1 mg Oral At Bedtime     fluticasone  2 spray Both Nostrils Daily     omeprazole (priLOSEC) CR capsule 20 mg  20 mg Oral QAM AC     lidocaine   Transdermal Q24H     lidocaine   Transdermal Q8H     cloNIDine (CATAPRES) tablet 0.1 mg  0.1 mg Oral BID     lisinopril (PRINIVIL/ZESTRIL) tablet 20 mg  20 mg Oral Daily     meclizine (ANTIVERT) tablet 25 mg, hydrOXYzine, acetaminophen, alum & mag hydroxide-simethicone, magnesium hydroxide, bisacodyl, traZODone, OLANZapine **OR** OLANZapine, nicotine polacrilex, melatonin tablet 5 mg, ondansetron          Allergies:     Allergies   Allergen Reactions     Seasonal Allergies      Wellbutrin [Bupropion] Hives and Swelling            Psychiatric Examination:   /82  Pulse 77  Temp 97.6  F (36.4  C) (Tympanic)  Resp 16  Ht 5' 7\" (1.702 m)  " Wt 213 lb 12.8 oz (97 kg)  SpO2 96%  BMI 33.49 kg/m2  Weight is 213 lbs 12.8 oz  Body mass index is 33.49 kg/(m^2).    Appearance:  awake, alert and dressed in hospital scrubs  Attitude:  cooperative  Eye Contact:  good  Mood:  better  Affect:  mood congruent and intensity is dramatic  Speech:  clear, coherent  Psychomotor Behavior:  no evidence of tardive dyskinesia, dystonia, or tics and intact station, gait and muscle tone  Thought Process:  scatttered, requires multiple prompts to redirect  Associations:  no loose associations  Thought Content:  no evidence of suicidal ideation or homicidal ideation and no evidence of psychotic thought  Insight:  fair  Judgment:  fair  Oriented to:  time, person, and place  Attention Span and Concentration:  fair  Recent and Remote Memory:  fair  Fund of Knowledge: low-normal  Muscle Strength and Tone: normal  Gait and Station: Normal  Perception: no perceptual disorder noted         Labs:     Recent Results (from the past 24 hour(s))   Glucose by meter    Collection Time: 05/20/17 11:42 AM   Result Value Ref Range    Glucose 120 (H) 70 - 99 mg/dL             Assessment/ Plan:   Medications: Continue on current medications.

## 2017-05-20 NOTE — PLAN OF CARE
Face to face end of shift report received from CHELSY Pink. Rounding completed. Patient observed. Pt out in [facility], has been given nicoderm patch, offers no complaints    NAVEED VELIZ  5/20/2017  4:28 PM

## 2017-05-20 NOTE — PLAN OF CARE
Problem: Goal Outcome Summary  Goal: Goal Outcome Summary  Outcome: Improving  Patient has been calm, cooperative this shift. Patient denies any SI, HI, and hallucinations. Patient states that he is having pain and is typically worse in the morning. He rated it 9/10, then rated it a 5/10 after administration of his morning medications. Patient also stated that the Lidocaine patches help a lot. Patient reports that when he looks at his book out of both eyes he is seeing black dots in the shape of patterns. He stated that they go away when he closes one eye. Patient also stated that he saw a bright area that was more pronounced on his book page. Patient's blood sugar was checked per providers suggestion. Patients accucheck was 120. Patient encouraged to drink fluids. Patient has been in the lounge visiting with peers throughout the day. Behaviors has been appropriate with peers/staff. Patient refusing Fluticatisone and provider made aware of this. Patient is to have surgery on a deviated septum prior to admission. He is somewhat upset about this due to them transferring him out of the hospital that he was supposed to have surgery. Able to maintain appropriate behaviors. Will continue to monitor.     Problem: Depression (Adult,Obstetrics,Pediatric)  Goal: Establish/Maintain Self-Care Routine  Patient will attend at least 50% of groups while on the unit.   Patient will be independent in daily ADL s.   Outcome: Improving  Patient has been independent of ADLs this shift.   Goal: Improved/Stable Mood  Patient will be compliant with treatment team recommendations.   Patient will verbalize decrease in depression prior to discharge.   Outcome: Improving  Patient has been compliant with treatmetn team recommendations.  Patient verbalizes decrease in his depression, but admits he still isn't 100 %. Reports increased fatigue.     Problem: Anxiety (Adult)  Goal: Reduction/Resolution  Patient will come to staff if needing PRN  medications for anxiety.   Patient will verbalize at least 2 effective coping mechanisms prior to discharge.   Patient will verbalize decrease in anxiety prior to discharge.   Outcome: Improving  Patient has not requested any PRN medications this shift.  Patient has verbalized two coping skills like reading, and taking a break away from unit milieu.  Patient reports decreased anxiety this shift.     Problem: Suicide Risk (Adult)  Goal: Strength-Based Wellness/Recovery  Patient will attend at least 50% of groups while on unit.   Outcome: Improving  Patient has not attended any groups this shift.   Goal: Physical Safety  Patient will contract for safety while on unit if having thoughts of self harm.   Patient will report absence of suicidal ideations prior to discharge.   Outcome: Improving  Patient has contracted for safety this shift and denies any suicidal thoughts.

## 2017-05-20 NOTE — PLAN OF CARE
Behavioral Health Team Discussion and Plan of Care Review:     Continued Stay Criteria/Rationale: suicidal ideation, suicide attempt, auditory/visual hallucinations    Plan: continue to stabilize on medications, Start Depakote  mg for one day, then increase to 750 mg, encourage Fluticasone nasal spray    Participants: Nurse practitioners, social work, OT, nursing, recreation therapy    Summary/Recommendation: Continue to stabilize on medications    Medical/Physical: See H & P    Progress: gradual progress

## 2017-05-20 NOTE — PLAN OF CARE
Face to face end of shift report received from nadeem Núñez . Rounding completed. Patient observed. Lying in supine position - eyes closed - soft audible snoring.    Nancie White  5/20/2017  2:25 AM

## 2017-05-21 PROCEDURE — 25000132 ZZH RX MED GY IP 250 OP 250 PS 637: Performed by: NURSE PRACTITIONER

## 2017-05-21 PROCEDURE — 99232 SBSQ HOSP IP/OBS MODERATE 35: CPT | Performed by: NURSE PRACTITIONER

## 2017-05-21 PROCEDURE — 12400000 ZZH R&B MH

## 2017-05-21 RX ADMIN — LIDOCAINE 2 PATCH: 50 PATCH TOPICAL at 11:14

## 2017-05-21 RX ADMIN — METAXALONE 800 MG: 800 TABLET ORAL at 15:45

## 2017-05-21 RX ADMIN — METAXALONE 800 MG: 800 TABLET ORAL at 20:16

## 2017-05-21 RX ADMIN — CLONIDINE HYDROCHLORIDE 0.1 MG: 0.1 TABLET ORAL at 08:57

## 2017-05-21 RX ADMIN — OMEPRAZOLE 20 MG: 20 CAPSULE, DELAYED RELEASE ORAL at 06:42

## 2017-05-21 RX ADMIN — NICOTINE 1 PATCH: 14 PATCH, EXTENDED RELEASE TRANSDERMAL at 08:56

## 2017-05-21 RX ADMIN — RISPERIDONE 1 MG: 1 TABLET ORAL at 19:59

## 2017-05-21 RX ADMIN — CLONIDINE HYDROCHLORIDE 0.1 MG: 0.1 TABLET ORAL at 19:58

## 2017-05-21 RX ADMIN — METAXALONE 800 MG: 800 TABLET ORAL at 08:58

## 2017-05-21 RX ADMIN — CELECOXIB 100 MG: 100 CAPSULE ORAL at 19:57

## 2017-05-21 RX ADMIN — FLUTICASONE PROPIONATE 2 SPRAY: 50 SPRAY, METERED NASAL at 08:56

## 2017-05-21 RX ADMIN — CELECOXIB 100 MG: 100 CAPSULE ORAL at 08:57

## 2017-05-21 RX ADMIN — TRAZODONE HYDROCHLORIDE 50 MG: 50 TABLET ORAL at 23:12

## 2017-05-21 RX ADMIN — DIVALPROEX SODIUM 750 MG: 250 TABLET, FILM COATED, EXTENDED RELEASE ORAL at 19:58

## 2017-05-21 RX ADMIN — DULOXETINE HYDROCHLORIDE 60 MG: 60 CAPSULE, DELAYED RELEASE ORAL at 08:57

## 2017-05-21 RX ADMIN — LISINOPRIL 20 MG: 20 TABLET ORAL at 08:57

## 2017-05-21 ASSESSMENT — ACTIVITIES OF DAILY LIVING (ADL)
DRESS: INDEPENDENT;SCRUBS (BEHAVIORAL HEALTH)
GROOMING: INDEPENDENT

## 2017-05-21 NOTE — PLAN OF CARE
"Problem: Goal Outcome Summary  Goal: Goal Outcome Summary  Pleasant and cooperative, pt is up on unit and interactive with staff and peers. Denied further SI, no HI or hallucinations at this time. Anxiety tolerable, does increase with his \"thoughts,\" added it's worse when he thinks about his brother's BS. Depression moderate, pt finds being busy on the unit is helpful as distraction. Expressed being more hopeful today after talking with NP Lisa, encouraged options to regain employment. Chronic pain managed at this time by scheduled medications. Pt showered this morning.  [facility] reported to writer that pt has been making inappropriate comments in group setting. Pt set goal in group today of 'dancing naked in the rain with his testicles swinging around.' Pt also noted to suggest \"pooling money to get a stripper\" for peer on unit with a birthday.    Problem: Depression (Adult,Obstetrics,Pediatric)  Goal: Establish/Maintain Self-Care Routine  Patient will attend at least 50% of groups while on the unit.   Patient will be independent in daily ADL s.   Outcome: Therapy, progress towards functional goals is fair  Fair, pt does attend groups and finds them helpful in keeping his mind occupied while on unit. Is indepdent in ADL's.  Goal: Improved/Stable Mood  Patient will be compliant with treatment team recommendations.   Patient will verbalize decrease in depression prior to discharge.   Outcome: Therapy, progress toward functional goals as expected  Is in agreement with treatment team plan, compliant with medications. Depression is tolerable, pt stated it is difficult to describe his level of depression.    Problem: Anxiety (Adult)  Goal: Reduction/Resolution  Patient will come to staff if needing PRN medications for anxiety.   Patient will verbalize at least 2 effective coping mechanisms prior to discharge.   Patient will verbalize decrease in anxiety prior to discharge.   Pt agreed to call staff if PRN needed for " "anxiety. Stated he finds group and activities on unit are a distraction and helpful for coping with depression. Anxiety manageable, worsens with \"thoughts of brother's BS.\"    Problem: Suicide Risk (Adult)  Goal: Physical Safety  Patient will contract for safety while on unit if having thoughts of self harm.   Patient will report absence of suicidal ideations prior to discharge.   Outcome: Therapy, progress toward functional goals as expected  Denied SI at this time.      "

## 2017-05-21 NOTE — PLAN OF CARE
Face to face end of shift report received from nadeem Valdivia . Rounding completed. Patient observed. Lying on left side - eyes closed - non-labored breathing noted.  trazadone given for insomnia effective.     Nancie White  5/21/2017  2:08 AM

## 2017-05-21 NOTE — PLAN OF CARE
Problem: Goal Outcome Summary  Goal: Goal Outcome Summary  Outcome: Improving  Pt has been out on unit, cooperative with plan of care and gets along with other patients. Wears lidoderm patch with relief of back pain, had patches removed early tonight as to not wake roommate if taken off after 11. Pt denied hallucinations this shift, denies si, states he has some depression, but feels it is better and his anxiety is tolerable and is due to him having confronted his brother about family matters. Pt took trazodone for sleep tonight.

## 2017-05-21 NOTE — PROGRESS NOTES
"[facility]  Psychiatric Progress Note    Subjective   This is a 41 year old male with bipolar 1 disorder recently started on Depakote for mood stabilization. Pt this am did ramble however less pressured and easier to redirect. Expressed feeling less stressed than yesterday, \"I know there is hope\". Also discussed how decreasing his elevated mood will decrease his excitatory response which in turn will help with his pain response.         DIagnoses:    Bipolar 1, most recent episode, mixed.  Attestation:  Patient has been seen and evaluated by me, Lisa Herr, JENNY DOMINIQUE, in the presence of the house staff team          Interim History:   The patient's care was discussed with the treatment team and chart notes were reviewed.          Medications:       nicotine   Transdermal Q8H     nicotine   Transdermal Daily     nicotine  1 patch Transdermal Daily     DULoxetine (CYMBALTA) EC capsule 60 mg  60 mg Oral Daily     divalproex  750 mg Oral At Bedtime     lidocaine  2 patch Transdermal Q24H     metaxalone  800 mg Oral TID     celecoxib  100 mg Oral BID     risperiDONE  1 mg Oral At Bedtime     fluticasone  2 spray Both Nostrils Daily     omeprazole (priLOSEC) CR capsule 20 mg  20 mg Oral QAM AC     lidocaine   Transdermal Q24H     lidocaine   Transdermal Q8H     cloNIDine (CATAPRES) tablet 0.1 mg  0.1 mg Oral BID     lisinopril (PRINIVIL/ZESTRIL) tablet 20 mg  20 mg Oral Daily     meclizine (ANTIVERT) tablet 25 mg, hydrOXYzine, acetaminophen, alum & mag hydroxide-simethicone, magnesium hydroxide, bisacodyl, traZODone, OLANZapine **OR** OLANZapine, melatonin tablet 5 mg, ondansetron          Allergies:     Allergies   Allergen Reactions     Seasonal Allergies      Wellbutrin [Bupropion] Hives and Swelling            Psychiatric Examination:   /96  Pulse 101  Temp 97.5  F (36.4  C) (Tympanic)  Resp 18  Ht 1.676 m (5' 6\")  Wt 97.7 kg (215 lb 4.8 oz)  SpO2 97%  BMI 34.75 kg/m2  Weight is 215 lbs " "4.8 oz  Body mass index is 34.75 kg/(m^2).    Appearance:  awake, alert and dressed in hospital scrubs  Attitude:  cooperative  Eye Contact:  good  Mood:  better, \"it gets better every day\"  Affect:  mood congruent and intensity is dramatic  Speech:  clear, coherent  Psychomotor Behavior:  no evidence of tardive dyskinesia, dystonia, or tics and intact station, gait and muscle tone  Thought Process:  Less scattered today, easier to redirect  Associations:  no loose associations  Thought Content:  no evidence of suicidal ideation or homicidal ideation and no evidence of psychotic thought  Insight:  fair  Judgment:  fair  Oriented to:  time, person, and place  Attention Span and Concentration:  fair  Recent and Remote Memory:  fair  Fund of Knowledge: low-normal  Muscle Strength and Tone: normal  Gait and Station: Normal  Perception: no perceptual disorder noted         Labs:     No results found for this or any previous visit (from the past 24 hour(s)).          Assessment/ Plan:   Medications: Continue on current medications.    "

## 2017-05-21 NOTE — PLAN OF CARE
Face to face end of shift report received from CHELSY [place]. Rounding completed. Patient observed.     Ni Hannah  5/21/2017  9:36 AM

## 2017-05-22 PROCEDURE — 12400000 ZZH R&B MH

## 2017-05-22 PROCEDURE — 25000132 ZZH RX MED GY IP 250 OP 250 PS 637: Performed by: NURSE PRACTITIONER

## 2017-05-22 PROCEDURE — 99239 HOSP IP/OBS DSCHRG MGMT >30: CPT | Performed by: NURSE PRACTITIONER

## 2017-05-22 RX ORDER — RISPERIDONE 1 MG/1
1 TABLET ORAL AT BEDTIME
Qty: 30 TABLET | Refills: 0 | Status: SHIPPED | OUTPATIENT
Start: 2017-05-22 | End: 2022-01-26

## 2017-05-22 RX ORDER — DIVALPROEX SODIUM 500 MG/1
1000 TABLET, EXTENDED RELEASE ORAL AT BEDTIME
Status: DISCONTINUED | OUTPATIENT
Start: 2017-05-22 | End: 2017-05-23 | Stop reason: HOSPADM

## 2017-05-22 RX ORDER — HYDROXYZINE HYDROCHLORIDE 25 MG/1
25-50 TABLET, FILM COATED ORAL EVERY 4 HOURS PRN
Qty: 120 TABLET | Refills: 0 | Status: SHIPPED | OUTPATIENT
Start: 2017-05-22 | End: 2022-01-26

## 2017-05-22 RX ORDER — METAXALONE 800 MG/1
800 TABLET ORAL 3 TIMES DAILY
Qty: 90 TABLET | Refills: 0 | Status: SHIPPED | OUTPATIENT
Start: 2017-05-22 | End: 2022-01-26

## 2017-05-22 RX ORDER — LISINOPRIL 20 MG/1
20 TABLET ORAL DAILY
Qty: 30 TABLET | Refills: 0 | Status: SHIPPED | OUTPATIENT
Start: 2017-05-22 | End: 2022-01-26

## 2017-05-22 RX ORDER — CLONIDINE HYDROCHLORIDE 0.1 MG/1
0.1 TABLET ORAL 2 TIMES DAILY
Qty: 60 TABLET | Refills: 0 | Status: SHIPPED | OUTPATIENT
Start: 2017-05-22 | End: 2022-01-26

## 2017-05-22 RX ORDER — CELECOXIB 100 MG/1
100 CAPSULE ORAL 2 TIMES DAILY
Qty: 60 CAPSULE | Refills: 0 | Status: SHIPPED | OUTPATIENT
Start: 2017-05-22 | End: 2022-01-26

## 2017-05-22 RX ORDER — DIVALPROEX SODIUM 500 MG/1
1000 TABLET, EXTENDED RELEASE ORAL AT BEDTIME
Qty: 60 TABLET | Refills: 0 | Status: SHIPPED | OUTPATIENT
Start: 2017-05-22 | End: 2022-01-26

## 2017-05-22 RX ADMIN — METAXALONE 800 MG: 800 TABLET ORAL at 08:50

## 2017-05-22 RX ADMIN — NICOTINE POLACRILEX 2 MG: 2 GUM, CHEWING ORAL at 23:00

## 2017-05-22 RX ADMIN — DULOXETINE HYDROCHLORIDE 60 MG: 60 CAPSULE, DELAYED RELEASE ORAL at 08:50

## 2017-05-22 RX ADMIN — METAXALONE 800 MG: 800 TABLET ORAL at 14:42

## 2017-05-22 RX ADMIN — CLONIDINE HYDROCHLORIDE 0.1 MG: 0.1 TABLET ORAL at 20:22

## 2017-05-22 RX ADMIN — RISPERIDONE 1 MG: 1 TABLET ORAL at 20:22

## 2017-05-22 RX ADMIN — METAXALONE 800 MG: 800 TABLET ORAL at 20:26

## 2017-05-22 RX ADMIN — CELECOXIB 100 MG: 100 CAPSULE ORAL at 20:22

## 2017-05-22 RX ADMIN — NICOTINE POLACRILEX 2 MG: 2 GUM, CHEWING ORAL at 17:48

## 2017-05-22 RX ADMIN — CLONIDINE HYDROCHLORIDE 0.1 MG: 0.1 TABLET ORAL at 08:51

## 2017-05-22 RX ADMIN — DIVALPROEX SODIUM 1000 MG: 500 TABLET, EXTENDED RELEASE ORAL at 20:22

## 2017-05-22 RX ADMIN — FLUTICASONE PROPIONATE 2 SPRAY: 50 SPRAY, METERED NASAL at 11:09

## 2017-05-22 RX ADMIN — OMEPRAZOLE 20 MG: 20 CAPSULE, DELAYED RELEASE ORAL at 06:50

## 2017-05-22 RX ADMIN — LISINOPRIL 20 MG: 20 TABLET ORAL at 08:51

## 2017-05-22 RX ADMIN — NICOTINE POLACRILEX 2 MG: 2 GUM, CHEWING ORAL at 20:24

## 2017-05-22 RX ADMIN — CELECOXIB 100 MG: 100 CAPSULE ORAL at 08:50

## 2017-05-22 RX ADMIN — NICOTINE POLACRILEX 2 MG: 2 GUM, CHEWING ORAL at 12:51

## 2017-05-22 RX ADMIN — LIDOCAINE 2 PATCH: 50 PATCH TOPICAL at 11:01

## 2017-05-22 RX ADMIN — NICOTINE POLACRILEX 2 MG: 2 GUM, CHEWING ORAL at 16:13

## 2017-05-22 RX ADMIN — NICOTINE 1 PATCH: 14 PATCH, EXTENDED RELEASE TRANSDERMAL at 08:57

## 2017-05-22 ASSESSMENT — ACTIVITIES OF DAILY LIVING (ADL)
DRESS: INDEPENDENT
GROOMING: INDEPENDENT

## 2017-05-22 NOTE — PLAN OF CARE
Face to face end of shift report received from Nancie VALENTINO. Rounding completed. Patient observed in room.     Lynn Rao  5/22/2017  7:55 AM

## 2017-05-22 NOTE — PLAN OF CARE
Problem: Goal Outcome Summary  Goal: Goal Outcome Summary  Outcome: Therapy, progress toward functional goals as expected  Pt denies SI HI hallucinations depression and anxiety. He does complain of poor sleep he also complains of chronic back pain.  He also requests an order for ashley gum.  Pt is encouraged to participate in unit programming. Pt is encouraged to shower.  Pt pleasant and cooperative.  complinat with medications but did refuse the nfasal spray this AM and wants it at 11:00 instead, because it makes him vomit in the AM . Time changed on MAR.      Probable discharge in the AM back home with  Follow up services.     Lidocaine patches off, fell off in shower.    Probable discharge tomorrow.   Problem: Depression (Adult,Obstetrics,Pediatric)  Goal: Establish/Maintain Self-Care Routine  Patient will attend at least 50% of groups while on the unit.   Patient will be independent in daily ADL s.   Outcome: Therapy, progress toward functional goals as expected  Pt is encouraged to participate in unit programming pt is independent with his ADL's   Goal: Improved/Stable Mood  Patient will be compliant with treatment team recommendations.   Patient will verbalize decrease in depression prior to discharge.   Outcome: Therapy, progress toward functional goals as expected  Pt is agreeable to the recommendations of the treatment team. Pt denies depressive symptoms at this time.     Problem: Anxiety (Adult)  Goal: Reduction/Resolution  Patient will come to staff if needing PRN medications for anxiety.   Patient will verbalize at least 2 effective coping mechanisms prior to discharge.   Patient will verbalize decrease in anxiety prior to discharge.   Outcome: Therapy, progress toward functional goals as expected  Pt is able to communicate his needs and denies anxiety at this time.  He will talk with staff if Anxiety symptoms increase.     Problem: Suicide Risk (Adult)  Goal: Strength-Based Wellness/Recovery  Patient will  attend at least 50% of groups while on unit.   Outcome: Therapy, progress toward functional goals as expected  Pt is encouraged to participate in unit programming  Goal: Physical Safety  Patient will contract for safety while on unit if having thoughts of self harm.   Patient will report absence of suicidal ideations prior to discharge.   Outcome: Therapy, progress toward functional goals as expected  Pt contracts to not harm self or others. And denies Suicidal ideation

## 2017-05-22 NOTE — PLAN OF CARE
Problem: Goal Outcome Summary  Goal: Goal Outcome Summary  Pt has been labile this afternoon, shared concern that he will be committed and has increased anxiety from this. Is more hopeful since NP spoke to him today, pt wants to return to work capacity in the future and this gives him hope. Denied further SI, no HI or hallucinations at this time. No physical complaints this evening.    Problem: Depression (Adult,Obstetrics,Pediatric)  Goal: Establish/Maintain Self-Care Routine  Patient will attend at least 50% of groups while on the unit.   Patient will be independent in daily ADL s.   Outcome: Therapy, progress toward functional goals as expected  Pt attending group this afternoon. Independent in ADLs    Problem: Anxiety (Adult)  Goal: Reduction/Resolution  Patient will come to staff if needing PRN medications for anxiety.   Patient will verbalize at least 2 effective coping mechanisms prior to discharge.   Patient will verbalize decrease in anxiety prior to discharge.   Outcome: Therapy, progress towards functional goals is fair  Pt does ask staff when has needs, anxiety continues and pt does ruminate about problems with brother.    Problem: Suicide Risk (Adult)  Goal: Strength-Based Wellness/Recovery  Patient will attend at least 50% of groups while on unit.   Attending groups.  Goal: Physical Safety  Patient will contract for safety while on unit if having thoughts of self harm.   Patient will report absence of suicidal ideations prior to discharge.   Outcome: Therapy, progress toward functional goals as expected  Pt denied thoughts of harm to self, no SI at this time.

## 2017-05-22 NOTE — PROGRESS NOTES
[facility]  Psychiatric Progress Note    Subjective   I have not seen him since Friday.  He is no longer labile like he was that day.  He did sleep all night. He has not had any suicidal thoughts since the second day he was here.  He is no longer as fixated on the money that his brother owes him.  His speech is no longer pressured. He is able to notice a difference that there has been improvement.  His last dose cymbalta was today.  I will increase his Depakote tonight 1000 mg.  This dose is still probably sub therapeutic for him though there has been significant improvement.  His hold is up today but he is willing to stay until tomorrow         DIagnoses:    Bipolar 1, most recent episode, mixed.  Attestation:  Patient has been seen and evaluated by me, Thea Mcfadden NP, in the presence of the house staff team          Interim History:   The patient's care was discussed with the treatment team and chart notes were reviewed.          Medications:       divalproex  1,000 mg Oral At Bedtime     nicotine   Transdermal Q8H     nicotine   Transdermal Daily     nicotine  1 patch Transdermal Daily     lidocaine  2 patch Transdermal Q24H     metaxalone  800 mg Oral TID     celecoxib  100 mg Oral BID     risperiDONE  1 mg Oral At Bedtime     fluticasone  2 spray Both Nostrils Daily     omeprazole (priLOSEC) CR capsule 20 mg  20 mg Oral QAM AC     lidocaine   Transdermal Q24H     lidocaine   Transdermal Q8H     cloNIDine (CATAPRES) tablet 0.1 mg  0.1 mg Oral BID     lisinopril (PRINIVIL/ZESTRIL) tablet 20 mg  20 mg Oral Daily     nicotine polacrilex, meclizine (ANTIVERT) tablet 25 mg, hydrOXYzine, acetaminophen, alum & mag hydroxide-simethicone, magnesium hydroxide, bisacodyl, traZODone, OLANZapine **OR** OLANZapine, melatonin tablet 5 mg, ondansetron          Allergies:     Allergies   Allergen Reactions     Seasonal Allergies      Wellbutrin [Bupropion] Hives and Swelling            Psychiatric  "Examination:   /72  Pulse 105  Temp 98.1  F (36.7  C)  Resp 20  Ht 1.676 m (5' 6\")  Wt 97.7 kg (215 lb 4.8 oz)  SpO2 95%  BMI 34.75 kg/m2  Weight is 215 lbs 4.8 oz  Body mass index is 34.75 kg/(m^2).    Appearance:  awake, alert and dressed in hospital scrubs  Attitude:  cooperative  Eye Contact:  good  Mood:  No longer labile.  Is brighter  Affect:  No longer traumatic  Speech:  clear, coherent  Psychomotor Behavior:  no evidence of tardive dyskinesia, dystonia, or tics and intact station, gait and muscle tone  Thought Process:  Minimally circumstantial  Associations:  no loose associations  Thought Content:  no evidence of suicidal ideation or homicidal ideation and no evidence of psychotic thought  Insight:  fair  Judgment:  Improved  Oriented to:  time, person, and place  Attention Span and Concentration:  fair  Recent and Remote Memory:  fair  Fund of Knowledge: low-normal  Muscle Strength and Tone: normal  Gait and Station: Normal  Perception: no perceptual disorder noted         Labs:     No results found for this or any previous visit (from the past 24 hour(s)).          Assessment/ Plan:   Discharge tomorrow  Increase Depakote ER to 1000 mg  "

## 2017-05-22 NOTE — PLAN OF CARE
Problem: Goal Outcome Summary  Goal: Goal Outcome Summary  Outcome: Improving  Slept all noc without issue

## 2017-05-22 NOTE — PLAN OF CARE
Problem: Discharge Planning  Goal: Discharge Planning (Adult, OB, Behavioral, Peds)  Outcome: Improving  Spoke with pt this afternoon and he states he is feeling ready for discharge- Pt will discharge home tomorrow- d/c planner working on setting up transportation through [employer].

## 2017-05-22 NOTE — PLAN OF CARE
Face to face end of shift report received from nadeem Dan. Rounding completed. Patient observed. Lying in supine position - eyes closed - non-labored breathing noted.     Nancie White  5/22/2017  3:25 AM

## 2017-05-23 VITALS
WEIGHT: 215.3 LBS | BODY MASS INDEX: 34.6 KG/M2 | DIASTOLIC BLOOD PRESSURE: 86 MMHG | TEMPERATURE: 97.2 F | SYSTOLIC BLOOD PRESSURE: 160 MMHG | OXYGEN SATURATION: 93 % | HEART RATE: 88 BPM | RESPIRATION RATE: 15 BRPM | HEIGHT: 66 IN

## 2017-05-23 PROCEDURE — 25000132 ZZH RX MED GY IP 250 OP 250 PS 637: Performed by: NURSE PRACTITIONER

## 2017-05-23 RX ADMIN — OMEPRAZOLE 20 MG: 20 CAPSULE, DELAYED RELEASE ORAL at 06:46

## 2017-05-23 RX ADMIN — LIDOCAINE 2 PATCH: 50 PATCH TOPICAL at 08:35

## 2017-05-23 RX ADMIN — METAXALONE 800 MG: 800 TABLET ORAL at 08:33

## 2017-05-23 RX ADMIN — NICOTINE POLACRILEX 2 MG: 2 GUM, CHEWING ORAL at 08:33

## 2017-05-23 RX ADMIN — NICOTINE 1 PATCH: 14 PATCH, EXTENDED RELEASE TRANSDERMAL at 08:33

## 2017-05-23 RX ADMIN — CLONIDINE HYDROCHLORIDE 0.1 MG: 0.1 TABLET ORAL at 08:33

## 2017-05-23 RX ADMIN — CELECOXIB 100 MG: 100 CAPSULE ORAL at 08:33

## 2017-05-23 RX ADMIN — LISINOPRIL 20 MG: 20 TABLET ORAL at 08:33

## 2017-05-23 NOTE — PLAN OF CARE
Problem: Goal Outcome Summary  Goal: Goal Outcome Summary  Outcome: Adequate for Discharge Date Met:  05/23/17  Pt denies SI HI Hallucinations depression anxiety he does admit to CP in back.  Inappropriate sexual talk, he apologizes and says he likes to flirt.  Reminded him about boundaries.  He is discharging this AM  Discharge paper work completed with patient. [facility] med[facility] except the skelaxen. Which is not covered.      Problem: Suicide Risk (Adult)  Goal: Physical Safety  Patient will contract for safety while on unit if having thoughts of self harm.   Patient will report absence of suicidal ideations prior to discharge.   Pt discharged

## 2017-05-23 NOTE — PLAN OF CARE
Problem: Goal Outcome Summary  Goal: Goal Outcome Summary  4314--in bed with eyes closed and breathing regular. 0634--up to lounge at this time. Not wearing his nicotine patch.

## 2017-05-23 NOTE — PLAN OF CARE
Face to face end of shift report received from CHELSY Dan. Rounding completed. Patient observed.     Rayo Moreno  5/23/2017  1:18 AM

## 2017-05-23 NOTE — PLAN OF CARE
Problem: Goal Outcome Summary  Goal: Goal Outcome Summary  Pt up on unit throughout the shift, has attended group. Continues to make inappropriate and suggestive comments with peers in lounge, frequently in presence of staff who redirect pt. Denied SI, HI and hallucinations, denied anxiety or depression. Taking medications as ordered, no physical complaints; has chronic back pain managed by scheduled medications. Is ready for discharge, planned for tomorrow.

## 2017-05-23 NOTE — PLAN OF CARE
Face to face end of shift report received from Cain RN. Rounding completed. Patient observed in [facility].     Lynn Rao  5/23/2017  7:44 AM

## 2017-05-23 NOTE — DISCHARGE SUMMARY
Psychiatric Discharge Summary    Joel Villalba MRN# [id number]   Age: 41 year old YOB: 1975     Date of Admission:  5/17/2017  Date of Discharge:  5/23/2017  Admitting Physician:  Ethan Coats MD  Discharge Physician:  Thea Mcfadden NP          Event Leading to Hospitalization and Hospital Stay   Joel Villalba is a 41 year old   male who presented via [facility] after being brought in by police for suicidal ideation. He has been having increased familial tension and issues with his brother who has supposedly borrowed  out of a sum of 20 thousand dollars or more over the past several years. He has not been paid back from him and is now becoming financialy strained. Over the past year he has become increasingly depressed and has begun hearing voices and visions of death and dying. Joel reports the voices are people saying his name and laughing at him In a mocking fashion. When he arrived to the unit he was extrem on his brother owing him money.  He was very  Difficult to direct in conversation and was interruptive and very pressured. He started denying suicidal thoughts thefirst day he was here.  He had just been discharged from the psychiatric unit, I believe the [facility], within the past 2 weeks.  At that time he must not have appeared to Be manic at that Time.  They had increased his Cymbalta.  He states that hes not think he has had any benefit from the Cymbalta.  I did taper the Cymbalta over a few day.  And started him on Depakote which  He tolerated well.  He is now sleeping much better.  He is no longer pressured and interruptive.  He is no longer is fixated on his brother owing him money though it still does bother him though he is much more rational about it.  His hold was up yesterday and he is wishing to leave today.  He is much more stable than on admission.  He is not a risk to himself or others.         At time of discharge, there is no evidence that  patient is in immediate danger of self or others.        DIagnoses:     Bipolar disorder type I most recent  episode mixed           Labs:     Results for orders placed or performed during the hospital encounter of 05/17/17   Glucose by meter   Result Value Ref Range    Glucose 120 (H) 70 - 99 mg/dL                    Discharge Medications:     Current Discharge Medication List      START taking these medications    Details   celecoxib (CELEBREX) 100 MG capsule Take 1 capsule (100 mg) by mouth 2 times daily  Qty: 60 capsule, Refills: 0    Associated Diagnoses: Suicide attempt (H)      hydrOXYzine (ATARAX) 25 MG tablet Take 1-2 tablets (25-50 mg) by mouth every 4 hours as needed for anxiety  Qty: 120 tablet, Refills: 0    Associated Diagnoses: Suicide attempt (H)      divalproex (DEPAKOTE ER) 500 MG 24 hr tablet Take 2 tablets (1,000 mg) by mouth At Bedtime  Qty: 60 tablet, Refills: 0    Associated Diagnoses: Suicide attempt (H)      risperiDONE (RISPERDAL) 1 MG tablet Take 1 tablet (1 mg) by mouth At Bedtime  Qty: 30 tablet, Refills: 0    Associated Diagnoses: Suicide attempt (H)      metaxalone (SKELAXIN) 800 MG tablet Take 1 tablet (800 mg) by mouth 3 times daily  Qty: 90 tablet, Refills: 0    Associated Diagnoses: Suicide attempt (H)         CONTINUE these medications which have CHANGED    Details   cloNIDine (CATAPRES) 0.1 MG tablet Take 1 tablet (0.1 mg) by mouth 2 times daily  Qty: 60 tablet, Refills: 0    Associated Diagnoses: Suicide attempt (H)      lisinopril (PRINIVIL/ZESTRIL) 20 MG tablet Take 1 tablet (20 mg) by mouth daily  Qty: 30 tablet, Refills: 0    Associated Diagnoses: Benign essential hypertension         CONTINUE these medications which have NOT CHANGED    Details   MELATONIN PO Take 5 mg by mouth At Bedtime      OMEPRAZOLE PO Take 20 mg by mouth daily      fluticasone (VERAMYST) 27.5 MCG/SPRAY spray Spray 2 sprays into both nostrils daily      MECLIZINE HCL PO Take 25 mg by mouth 3 times daily  as needed for dizziness         STOP taking these medications       DULoxetine HCl (CYMBALTA PO) Comments:   Reason for Stopping:         NAPROXEN PO Comments:   Reason for Stopping:         TRAZODONE HCL PO Comments:   Reason for Stopping:                        Psychiatric Examination:   Appearance:  awake, alert and adequately groomed  Attitude:  cooperative  Eye Contact:  fair  Mood:  anxious  Affect:  intensity is heightened  Speech:  clear, coherent  Psychomotor Behavior:  no evidence of tardive dyskinesia, dystonia, or tics  Thought Process:  logical and goal oriented  Associations:  no loose associations  Thought Content:  no evidence of suicidal ideation or homicidal ideation, no evidence of psychotic thought and no visual hallucinations present  Insight:  fair  Judgment:  fair  Oriented to:  time, person, and place  Attention Span and Concentration:  intact  Recent and Remote Memory:  intact  Fund of Knowledge: appropriate  Muscle Strength and Tone: normal  Gait and Station: Normal  Perception:        Discharge Plan:       I will be discharging him with a lab slip to have a CMP, a Depakote level, and a CBC in 3 days.  He'll be bringing this order to his  [facility] that he typically uses.I will have the results faxed to his  Primary psychiatric nurse practitioner.    Psychiatry Follow-up:      [facility]  PCP- Casey Ellis - June 1st @ 10:30   [address] [place] [address]  Phone: [phone]  Fax: [phone]     [facility]   Therapy - Elham Kohler - June 5th @ 1:30 (bring insurance card and photo ID)   [facility] - referral faxed on 5/19/2017  [address],   [place] [address]   Phone: [phone]  Fax: [phone]     [facility]  Case management - referral faxed on 5/18/2017   Phone: [phone]  Fax: [phone]     Resources:   Crisis Intervention: 200.995.9914 or 010-452-3289 (TTY: 381.505.8038). Call anytime for help.  National [place] on Mental  Illness (www.[place].isaías.org): 501-089-7755 or 189-698-5933.  Alcoholics Anonymous (www.alcoholics-anonymous.org): Check your phone book for your local chapter.  Suicide Awareness Voices of Education (SAVE) (www.save.org): 728-613-GQRZ (4035)  National Suicide Prevention Line (www.mentalhealthmn.org): 807-649-VENO (4063)  Mental Health Consumer/Survivor Network of [place] (www.mhcsn.net): 181.120.1748 or 591-372-3080  Mental Health Association of [place] (www.mentalhealth.org): 671.110.3081 or 495-906-0977    Attestation:  The patient has been seen and evaluated by me,  April Alexandrea Mcfadden NP         Discharge Services Provided:     35minutes spent on discharge services, including:  Final examination of patient.  Review and discussion of Hospital stay.  Instructions for continued outpatient care/goals.  Preparation of discharge records.  Preparation of medications refills and new prescriptions.  Preparation of Applicable referral forms.

## 2017-05-23 NOTE — PLAN OF CARE
Problem: Discharge Planning  Goal: Discharge Planning (Adult, OB, Behavioral, Peds)  Outcome: Adequate for Discharge Date Met:  05/23/17  Pt is discharging at the recommendation of the treatment team. Pt is discharging to home transported by [employer]. Pt denies having any thoughts of hurting themself or anyone else. Pt denies anxiety or depression. Pt has follow up with [facility] and [facility] . Discharge instructions, including; demographic sheet, psychiatric evaluation, discharge summary, and AVS were faxed to these next level of care providers by discharge planner.

## 2022-01-26 ENCOUNTER — OFFICE VISIT (OUTPATIENT)
Dept: FAMILY MEDICINE | Facility: CLINIC | Age: 47
End: 2022-01-26
Payer: COMMERCIAL

## 2022-01-26 VITALS
HEIGHT: 67 IN | HEART RATE: 103 BPM | WEIGHT: 264.9 LBS | OXYGEN SATURATION: 94 % | BODY MASS INDEX: 41.58 KG/M2 | DIASTOLIC BLOOD PRESSURE: 99 MMHG | SYSTOLIC BLOOD PRESSURE: 145 MMHG | TEMPERATURE: 98.6 F

## 2022-01-26 DIAGNOSIS — Z87.448 HISTORY OF ACUTE RENAL FAILURE: ICD-10-CM

## 2022-01-26 DIAGNOSIS — G47.33 OSA (OBSTRUCTIVE SLEEP APNEA): ICD-10-CM

## 2022-01-26 DIAGNOSIS — E66.01 MORBID OBESITY (H): ICD-10-CM

## 2022-01-26 DIAGNOSIS — R51.9 NONINTRACTABLE EPISODIC HEADACHE, UNSPECIFIED HEADACHE TYPE: ICD-10-CM

## 2022-01-26 DIAGNOSIS — Z86.59 HISTORY OF DEPRESSION: ICD-10-CM

## 2022-01-26 DIAGNOSIS — J34.89 NASAL OBSTRUCTION: ICD-10-CM

## 2022-01-26 DIAGNOSIS — M51.369 DDD (DEGENERATIVE DISC DISEASE), LUMBAR: ICD-10-CM

## 2022-01-26 DIAGNOSIS — G56.01 CARPAL TUNNEL SYNDROME OF RIGHT WRIST: ICD-10-CM

## 2022-01-26 DIAGNOSIS — R73.09 ELEVATED GLUCOSE: ICD-10-CM

## 2022-01-26 DIAGNOSIS — Z13.220 SCREENING CHOLESTEROL LEVEL: ICD-10-CM

## 2022-01-26 DIAGNOSIS — I10 HYPERTENSION GOAL BP (BLOOD PRESSURE) < 140/90: Primary | ICD-10-CM

## 2022-01-26 DIAGNOSIS — R74.8 ELEVATED LIVER ENZYMES: ICD-10-CM

## 2022-01-26 DIAGNOSIS — N52.9 ERECTILE DYSFUNCTION, UNSPECIFIED ERECTILE DYSFUNCTION TYPE: ICD-10-CM

## 2022-01-26 LAB
ALBUMIN SERPL-MCNC: 3.8 G/DL (ref 3.4–5)
ALP SERPL-CCNC: 99 U/L (ref 40–150)
ALT SERPL W P-5'-P-CCNC: 78 U/L (ref 0–70)
ANION GAP SERPL CALCULATED.3IONS-SCNC: 2 MMOL/L (ref 3–14)
AST SERPL W P-5'-P-CCNC: 44 U/L (ref 0–45)
BILIRUB SERPL-MCNC: 1.1 MG/DL (ref 0.2–1.3)
BUN SERPL-MCNC: 20 MG/DL (ref 7–30)
CALCIUM SERPL-MCNC: 9.4 MG/DL (ref 8.5–10.1)
CHLORIDE BLD-SCNC: 107 MMOL/L (ref 94–109)
CHOLEST SERPL-MCNC: 156 MG/DL
CO2 SERPL-SCNC: 32 MMOL/L (ref 20–32)
CREAT SERPL-MCNC: 0.86 MG/DL (ref 0.66–1.25)
ERYTHROCYTE [DISTWIDTH] IN BLOOD BY AUTOMATED COUNT: 13 % (ref 10–15)
FASTING STATUS PATIENT QL REPORTED: NO
GFR SERPL CREATININE-BSD FRML MDRD: >90 ML/MIN/1.73M2
GLUCOSE BLD-MCNC: 124 MG/DL (ref 70–99)
HBA1C MFR BLD: 6.3 % (ref 0–5.6)
HCT VFR BLD AUTO: 45.2 % (ref 40–53)
HDLC SERPL-MCNC: 52 MG/DL
HGB BLD-MCNC: 14.5 G/DL (ref 13.3–17.7)
LDLC SERPL CALC-MCNC: 84 MG/DL
MCH RBC QN AUTO: 28.9 PG (ref 26.5–33)
MCHC RBC AUTO-ENTMCNC: 32.1 G/DL (ref 31.5–36.5)
MCV RBC AUTO: 90 FL (ref 78–100)
NONHDLC SERPL-MCNC: 104 MG/DL
PLATELET # BLD AUTO: 169 10E3/UL (ref 150–450)
POTASSIUM BLD-SCNC: 3.8 MMOL/L (ref 3.4–5.3)
PROT SERPL-MCNC: 7.7 G/DL (ref 6.8–8.8)
RBC # BLD AUTO: 5.01 10E6/UL (ref 4.4–5.9)
SODIUM SERPL-SCNC: 141 MMOL/L (ref 133–144)
TRIGL SERPL-MCNC: 101 MG/DL
WBC # BLD AUTO: 8.3 10E3/UL (ref 4–11)

## 2022-01-26 PROCEDURE — 36415 COLL VENOUS BLD VENIPUNCTURE: CPT | Performed by: FAMILY MEDICINE

## 2022-01-26 PROCEDURE — 99204 OFFICE O/P NEW MOD 45 MIN: CPT | Performed by: FAMILY MEDICINE

## 2022-01-26 PROCEDURE — 80061 LIPID PANEL: CPT | Performed by: FAMILY MEDICINE

## 2022-01-26 PROCEDURE — 80053 COMPREHEN METABOLIC PANEL: CPT | Performed by: FAMILY MEDICINE

## 2022-01-26 PROCEDURE — 84403 ASSAY OF TOTAL TESTOSTERONE: CPT | Performed by: FAMILY MEDICINE

## 2022-01-26 PROCEDURE — 83036 HEMOGLOBIN GLYCOSYLATED A1C: CPT | Performed by: FAMILY MEDICINE

## 2022-01-26 PROCEDURE — 85027 COMPLETE CBC AUTOMATED: CPT | Performed by: FAMILY MEDICINE

## 2022-01-26 RX ORDER — AMLODIPINE BESYLATE 5 MG/1
5 TABLET ORAL DAILY
Qty: 30 TABLET | Refills: 2 | Status: SHIPPED | OUTPATIENT
Start: 2022-01-26 | End: 2022-03-09

## 2022-01-26 RX ORDER — CLONIDINE HYDROCHLORIDE 0.1 MG/1
0.1 TABLET ORAL 2 TIMES DAILY
Qty: 60 TABLET | Refills: 2 | Status: SHIPPED | OUTPATIENT
Start: 2022-01-26 | End: 2022-04-13

## 2022-01-26 ASSESSMENT — ENCOUNTER SYMPTOMS
DIZZINESS: 0
ABDOMINAL PAIN: 0
HEARTBURN: 1
NAUSEA: 0
SHORTNESS OF BREATH: 1
MYALGIAS: 1
COUGH: 1
CHILLS: 0
JOINT SWELLING: 0
HEMATOCHEZIA: 0
HEMATURIA: 0
NERVOUS/ANXIOUS: 1
PALPITATIONS: 0
HEADACHES: 1
CONSTIPATION: 0
EYE PAIN: 0
WEAKNESS: 0
DYSURIA: 0
DIARRHEA: 0
ARTHRALGIAS: 1
FEVER: 0
SORE THROAT: 0
PARESTHESIAS: 0
FREQUENCY: 1

## 2022-01-26 ASSESSMENT — PAIN SCALES - GENERAL: PAINLEVEL: SEVERE PAIN (6)

## 2022-01-26 ASSESSMENT — PATIENT HEALTH QUESTIONNAIRE - PHQ9
SUM OF ALL RESPONSES TO PHQ QUESTIONS 1-9: 21
SUM OF ALL RESPONSES TO PHQ QUESTIONS 1-9: 21
10. IF YOU CHECKED OFF ANY PROBLEMS, HOW DIFFICULT HAVE THESE PROBLEMS MADE IT FOR YOU TO DO YOUR WORK, TAKE CARE OF THINGS AT HOME, OR GET ALONG WITH OTHER PEOPLE: NOT DIFFICULT AT ALL

## 2022-01-26 ASSESSMENT — ACTIVITIES OF DAILY LIVING (ADL): CURRENT_FUNCTION: NO ASSISTANCE NEEDED

## 2022-01-26 ASSESSMENT — MIFFLIN-ST. JEOR: SCORE: 2022.27

## 2022-01-26 NOTE — PROGRESS NOTES
"  Assessment & Plan      Originally scheduled as a \"physical\" but this is definitely more of a visit to establish care and follow-up on significant chronic disease. Patient has a very extensive medical history but this is his first time seen through our system or by me. I was able to review previous records through multiple systems through care everywhere. In total spent about 50 minutes in reviewing previous history, the visit with the patient, follow-up plans and charting.    Hypertension goal BP (blood pressure) < 140/90  Longstanding history of hypertension. Patient has been off all medications for quite some time. He does have a history of an acute kidney injury and I know for a while there was caution with using ACE inhibitors. It does seem that recent renal function was normal. It looks like most recently he was on clonidine and amlodipine and I think we just need to establish more of a histrionic pattern before considering ACE inhibitors or ARB's. So we will get him started back on his medication and take a look at his lab work and plan follow-up in about 6 weeks.  - cloNIDine (CATAPRES) 0.1 MG tablet; Take 1 tablet (0.1 mg) by mouth 2 times daily  - amLODIPine (NORVASC) 5 MG tablet; Take 1 tablet (5 mg) by mouth daily  - Comprehensive metabolic panel; Future  - CBC with platelets; Future  - CBC with platelets  - Comprehensive metabolic panel    Morbid obesity (H)  Patient understands that he needs to work on diet and exercise to help bring this down. Long-term health implications.    History of acute renal failure  As above. Need to establish more of a history and pattern.  - Comprehensive metabolic panel; Future  - Comprehensive metabolic panel    Elevated liver enzymes  Liver functions have been mildly elevated consistently over the past couple of years. Hard to know what may be medication related and to be interesting to see where they are at now but this factors into some of his treatments as well.  - " Comprehensive metabolic panel; Future  - Comprehensive metabolic panel    Elevated glucose  Was on Metformin for a while and I have seen random glucose readings as high as 160. Patient says he was never officially diagnosed with diabetes so he made have been more in the prediabetic range. Was on some antipsychotic medications that also are a risk factor. Now off of those.  - Hemoglobin A1c; Future  - Hemoglobin A1c    DDD (degenerative disc disease), lumbar  Longstanding history of patients that it has been suggested that he have a lumbar fusion. But he wants to pursue the radiofrequency ablation instead. I discussed with patient that I think the neck step is to get him in with our spine team and work through there protocols. They can piece together outside information with anything new such as MRI, etc.  - Spine  Referral; Future    NONA (obstructive sleep apnea)  Longstanding history which certainly fits with his body type. He does have a CPAP machine but needs new supplies and we will help obtain those. But he has a longstanding history of nasal obstruction especially in the left nasal passage wants to meet with an ENT about the possibility of surgical correction. We will get him plugged in with our team.  - CPAP Order for DME - ONLY FOR DME  - Otolaryngology Referral; Future    Nasal obstruction  As above  - Otolaryngology Referral; Future    Carpal tunnel syndrome of right wrist  Mild to moderate symptoms. Less of a priority but does want to talk with someone about this  - Orthopedic  Referral; Future    Nonintractable episodic headache, unspecified headache type  Patient said he has been having frequent headaches that seem to be in his occipital region or up to the top of his head. Discussed that this is likely cervicogenic. We will keep an eye on this for now. Some of his potential treatments would hinge upon results of liver and kidney function.    Erectile dysfunction, unspecified erectile  "dysfunction type  Actually this seems to be more of an issue of decrease in penile mass the patient thinks might be related to issues with his back. We discussed the things do not necessarily change in size but certainly surrounding tissues can change. He is interested in meeting with a urologist and I think that this would be reasonable. Given his metabolic picture I think we should take a look at testosterone level as well.  - Testosterone total; Future  - Adult Urology Referral; Future  - Testosterone total    History of depression  I have seen this classified as both depression and schizophrenia for the patient. Patient said he does not think he is schizophrenic and does not think that the medications for depression were helping and does not want to go back on anything at this time. Personality wise he seems to be appropriate and I do not note any flat affect or similar. So I think this is going to take some time to classify.    Screening cholesterol level    - Lipid panel reflex to direct LDL Non-fasting; Future  - Lipid panel reflex to direct LDL Non-fasting    Extensive review of numerous records from outside sources     BMI:   Estimated body mass index is 42.12 kg/m  as calculated from the following:    Height as of this encounter: 1.689 m (5' 6.5\").    Weight as of this encounter: 120.2 kg (264 lb 14.4 oz).   Weight management plan: Discussed healthy diet and exercise guidelines    Depression Screening Follow Up    PHQ 1/26/2022   PHQ-9 Total Score 21   Q9: Thoughts of better off dead/self-harm past 2 weeks Nearly every day   F/U: Thoughts of suicide or self-harm Yes   F/U: Self harm-plan No   F/U: Self-harm action No   F/U: Safety concerns No     We discussed PHQ-9 score as above.    See Patient Instructions    Return in about 6 weeks (around 3/9/2022) for BP recheck, Follow up of Chronic Issues, in office.    Padma Schultz MD  [facility] 46 [facility] " "old who presents for the following health issues     HPI     Here today to establish care and go through ongoing issues my first visit with the patient. Says he has a lot of problems and wants to see various specialists.    Review of Systems   Constitutional, HEENT, cardiovascular, pulmonary, gi and gu systems are negative, except as otherwise noted.      Objective    BP (!) 145/99 (BP Location: Right arm, Patient Position: Sitting, Cuff Size: Adult Large)   Pulse 103   Temp 98.6  F (37  C) (Oral)   Ht 1.689 m (5' 6.5\")   Wt 120.2 kg (264 lb 14.4 oz)   SpO2 94%   BMI 42.12 kg/m    Body mass index is 42.12 kg/m .  Physical Exam   Alert and pleasant freely communicative. Affect is appropriate.  Morbidly obese  Ears normal. Throat and pharynx normal. Neck supple. No adenopathy or masses in the neck or supraclavicular regions. Sinuses non tender.  S1 and S2 normal, no murmurs, clicks, gallops or rubs. Regular rate and rhythm. Chest is clear; no wheezes or rales. No edema or JVD.    Reviewed outside lab work and imaging              "

## 2022-01-26 NOTE — PROGRESS NOTES
"SUBJECTIVE:   CC: Joel Villalba is an 46 year old male who presents for preventative health visit.     {Split Bill scripting  The purpose of this visit is to discuss your medical history and prevent health problems before you are sick. You may be responsible for a co-pay, coinsurance, or deductible if your visit today includes services such as checking on a sore throat, having an x-ray or lab test, or treating and evaluating a new or existing condition :679068}  {Patient advised of split billing (Optional):812088}  HPI  {Add if <65 person on Medicare  - Required Questions (Optional):842726}  {Outside tests to abstract? :608903}    {additional problems to add (Optional):730560}    Today's PHQ-2 Score: No flowsheet data found.    Abuse: Current or Past(Physical, Sexual or Emotional)- { :041539}  Do you feel safe in your environment? { :021947}    Have you ever done Advance Care Planning? (For example, a Health Directive, POLST, or a discussion with a medical provider or your loved ones about your wishes): { :834485}    Social History     Tobacco Use     Smoking status: Not on file     Smokeless tobacco: Not on file   Substance Use Topics     Alcohol use: Not on file     {Rooming Staff- Complete this question if Prescreen response is not shown below for today's visit. If you drink alcohol do you typically have >3 drinks per day or >7 drinks per week? (Optional):372930}    No flowsheet data found.{add AUDIT responses (Optional) (A score of 7 for adult men is an indication of hazardous drinking; a score of 8 or more is an indication of an alcohol use disorder.  A score of 7 or more for adult women is an indication of hazardous drinking or an alchohol use disorder):608683}    Last PSA: No results found for: PSA    Reviewed orders with patient. Reviewed health maintenance and updated orders accordingly - { :049983::\"Yes\"}  {Chronicprobdata (optional):702942}    Reviewed and updated as needed this visit by clinical " "staff                Reviewed and updated as needed this visit by Provider               {HISTORY OPTIONS (Optional):426317}    Review of Systems  {MALE ROS (Optional):958101::\"CONSTITUTIONAL: NEGATIVE for fever, chills, change in weight\",\"INTEGUMENTARY/SKIN: NEGATIVE for worrisome rashes, moles or lesions\",\"EYES: NEGATIVE for vision changes or irritation\",\"ENT: NEGATIVE for ear, mouth and throat problems\",\"RESP: NEGATIVE for significant cough or SOB\",\"CV: NEGATIVE for chest pain, palpitations or peripheral edema\",\"GI: NEGATIVE for nausea, abdominal pain, heartburn, or change in bowel habits\",\" male: negative for dysuria, hematuria, decreased urinary stream, erectile dysfunction, urethral discharge\",\"MUSCULOSKELETAL: NEGATIVE for significant arthralgias or myalgia\",\"NEURO: NEGATIVE for weakness, dizziness or paresthesias\",\"PSYCHIATRIC: NEGATIVE for changes in mood or affect\"}    OBJECTIVE:   There were no vitals taken for this visit.    Physical Exam  {Exam Choices (Optional):686363}    {Diagnostic Test Results (Optional):886807::\"Diagnostic Test Results:\",\"Labs reviewed in Epic\"}    ASSESSMENT/PLAN:   {Diag Picklist:641813}    {Patient advised of split billing (Optional):069188}    COUNSELING:   {MALE COUNSELING MESSAGES:196062::\"Reviewed preventive health counseling, as reflected in patient instructions\"}    Estimated body mass index is 34.75 kg/m  as calculated from the following:    Height as of 5/21/17: 1.676 m (5' 6\").    Weight as of 5/21/17: 97.7 kg (215 lb 4.8 oz).     {Weight Management Plan (ACO) Complete if BMI is abnormal-  Ages 18-64  BMI >24.9.  Age 65+ with BMI <23 or >30 (Optional):474362}    He has no history on file for tobacco use.      Counseling Resources:  ATP IV Guidelines  Pooled Cohorts Equation Calculator  FRAX Risk Assessment  ICSI Preventive Guidelines  Dietary Guidelines for Americans, 2010  USDA's MyPlate  ASA Prophylaxis  Lung CA Screening    Padma Schultz MD   HEALTH " [facility]

## 2022-01-27 ASSESSMENT — PATIENT HEALTH QUESTIONNAIRE - PHQ9: SUM OF ALL RESPONSES TO PHQ QUESTIONS 1-9: 21

## 2022-01-28 ENCOUNTER — TELEPHONE (OUTPATIENT)
Dept: FAMILY MEDICINE | Facility: CLINIC | Age: 47
End: 2022-01-28
Payer: COMMERCIAL

## 2022-01-28 LAB — TESTOST SERPL-MCNC: 464 NG/DL (ref 240–950)

## 2022-01-28 NOTE — RESULT ENCOUNTER NOTE
Please mail results and note to patient:    Joel,  Your lab work looks good overall.  Testosterone level is normal.  Sugar level is only slightly out of the normal range, certainly nothing anywhere near diabetes and nothing that we need to do anything about other than keep up on diet and exercise.  You certainly do not need a medication.  So I think overall this was good news.  HARRIS Schultz M.D.

## 2022-03-09 ENCOUNTER — OFFICE VISIT (OUTPATIENT)
Dept: FAMILY MEDICINE | Facility: CLINIC | Age: 47
End: 2022-03-09
Payer: COMMERCIAL

## 2022-03-09 VITALS
TEMPERATURE: 97.9 F | HEIGHT: 67 IN | SYSTOLIC BLOOD PRESSURE: 147 MMHG | OXYGEN SATURATION: 96 % | WEIGHT: 261 LBS | RESPIRATION RATE: 20 BRPM | DIASTOLIC BLOOD PRESSURE: 103 MMHG | HEART RATE: 88 BPM | BODY MASS INDEX: 40.97 KG/M2

## 2022-03-09 DIAGNOSIS — I10 HYPERTENSION GOAL BP (BLOOD PRESSURE) < 140/90: Primary | ICD-10-CM

## 2022-03-09 DIAGNOSIS — F33.1 MODERATE EPISODE OF RECURRENT MAJOR DEPRESSIVE DISORDER (H): ICD-10-CM

## 2022-03-09 DIAGNOSIS — M51.369 DDD (DEGENERATIVE DISC DISEASE), LUMBAR: ICD-10-CM

## 2022-03-09 PROBLEM — Z86.59 HISTORY OF DEPRESSION: Status: RESOLVED | Noted: 2022-01-26 | Resolved: 2022-03-09

## 2022-03-09 PROCEDURE — 99214 OFFICE O/P EST MOD 30 MIN: CPT | Performed by: FAMILY MEDICINE

## 2022-03-09 RX ORDER — AMLODIPINE BESYLATE 10 MG/1
10 TABLET ORAL DAILY
Qty: 30 TABLET | Refills: 5 | Status: SHIPPED | OUTPATIENT
Start: 2022-03-09

## 2022-03-09 RX ORDER — HYDROCODONE BITARTRATE AND ACETAMINOPHEN 5; 325 MG/1; MG/1
1 TABLET ORAL 2 TIMES DAILY PRN
Qty: 18 TABLET | Refills: 0 | Status: SHIPPED | OUTPATIENT
Start: 2022-03-09

## 2022-03-09 RX ORDER — IBUPROFEN 200 MG
200 TABLET ORAL EVERY 4 HOURS PRN
COMMUNITY
End: 2022-04-13

## 2022-03-09 RX ORDER — CYCLOBENZAPRINE HCL 10 MG
10 TABLET ORAL 3 TIMES DAILY PRN
Qty: 30 TABLET | Refills: 0 | Status: SHIPPED | OUTPATIENT
Start: 2022-03-09 | End: 2022-03-16

## 2022-03-09 ASSESSMENT — PATIENT HEALTH QUESTIONNAIRE - PHQ9
10. IF YOU CHECKED OFF ANY PROBLEMS, HOW DIFFICULT HAVE THESE PROBLEMS MADE IT FOR YOU TO DO YOUR WORK, TAKE CARE OF THINGS AT HOME, OR GET ALONG WITH OTHER PEOPLE: NOT DIFFICULT AT ALL
SUM OF ALL RESPONSES TO PHQ QUESTIONS 1-9: 15
SUM OF ALL RESPONSES TO PHQ QUESTIONS 1-9: 15

## 2022-03-09 ASSESSMENT — PAIN SCALES - GENERAL: PAINLEVEL: MODERATE PAIN (4)

## 2022-03-09 NOTE — PATIENT INSTRUCTIONS
Spine referral:    To schedule your appointment, or you may contact the  Representative at: [phone]

## 2022-03-09 NOTE — PROGRESS NOTES
Assessment & Plan      Patient with long complex history.  I first met him a couple of months ago and we are still in the process of piecing together previous history with current ongoing issues.    Hypertension goal BP (blood pressure) < 140/90  Started him back on amlodipine and clonidine.  Blood pressure a little bit better and patient says he is actually feeling better.  Will increase amlodipine to 10 mg daily and plan follow-up in a couple of months.  - amLODIPine (NORVASC) 10 MG tablet; Take 1 tablet (10 mg) by mouth daily    DDD (degenerative disc disease), lumbar  Has not yet scheduled with the spine clinic but is interested in talking about radiofrequency ablation.  In looking back through his chart through another system he has a fairly extensive back history.  At 1 point he was seeing [facility] pain clinic.  Would get occasional Vicodin from his provider and there was at 1 point mention of worrying about him getting narcotics off the street.  But that was the only time I saw it mention and in reviewing the  database he has had no prescriptions for any controlled substances in the past year.  So while he is getting plugged in with our spine folks will be very cautious with medical therapy and I am willing to prescribe a small amount of Flexeril and hydrocodone but will monitor this carefully.  I discussed directly with the patient that treating with ongoing narcotic therapy is not something that I am willing to do for him.  So if there are calls for refills I would favor sending him to a pain management specialist other options.  - cyclobenzaprine (FLEXERIL) 10 MG tablet; Take 1 tablet (10 mg) by mouth 3 times daily as needed for muscle spasms  - HYDROcodone-acetaminophen (NORCO) 5-325 MG tablet; Take 1 tablet by mouth 2 times daily as needed for severe pain    Moderate episode of recurrent major depressive disorder (H)  Longstanding depression history patient really does not want to take any  medications.  Asked him to think about meeting with psychiatry to talk about newer agents and he will give it some thought.         Depression Screening Follow Up    PHQ 3/9/2022   PHQ-9 Total Score 15   Q9: Thoughts of better off dead/self-harm past 2 weeks Several days   F/U: Thoughts of suicide or self-harm Yes   F/U: Self harm-plan No   F/U: Self-harm action No   F/U: Safety concerns No     Discussed his PHQ-9 scores and answers.  Patient says this is baseline for him and denies any concrete plans for self-harm.    See Patient Instructions    Return in about 3 months (around 6/9/2022) for Follow up of Chronic Issues, in office.    Padma Schultz MD  [facility]    Srinivasan Maldonado is a 46 year old who presents for the following health issues  accompanied by his self.    History of Present Illness       Back Pain:  He presents for follow up of back pain. Patient's back pain is a chronic problem.  Location of back pain:  Right lower back and left lower back  Description of back pain: sharp, shooting and stabbing  Back pain spreads: nowhere    Since patient first noticed back pain, pain is: always present, but gets better and worse  Does back pain interfere with his job:  Not applicable      Mental Health Follow-up:                    Today's PHQ-9         PHQ-9 Total Score: 15  PHQ-9 Q9 Thoughts of better off dead/self-harm past 2 weeks :   (P) Several days  Thoughts of suicide or self harm: (P) Yes  Self-harm Plan:   (P) No  Self-harm Action:     (P) No  Safety concerns for self or others: (P) No    How difficult have these problems made it for you to do your work, take care of things at home, or get along with other people: Not difficult at all        Hypertension: He presents for follow up of hypertension.  He does not check blood pressure  regularly outside of the clinic. Outpatient blood pressures have not been over 140/90. He does not follow a low salt diet.     Migraines:    "Since the patient's last clinic visit, headaches are: improved  The patient is getting headaches:  Was getting them 15 or more monthly days and evenings, has lessened in frequency and severity this last month thankfully.  He is not able to do normal daily activities when he has a migraine.  The patient is taking the following rescue/relief medications:  Ibuprofen (Advil, Motrin)   Patient states \"I get some relief\" from the rescue/relief medications.   The patient is taking the following medications to prevent migraines:  No medications to prevent migraines  In the past 4 weeks, the patient has gone to an Urgent Care or Emergency Room 0 times times due to headaches.    He eats 0-1 servings of fruits and vegetables daily.He consumes 4 sweetened beverage(s) daily.He exercises with enough effort to increase his heart rate 9 or less minutes per day.  He exercises with enough effort to increase his heart rate 3 or less days per week. He is missing 2 dose(s) of medications per week.     Here today in follow-up of issues we began discussing a couple of months ago.  Back still this is biggest problem.  Still dealing with depression but does not really want to do anything about it.  Migraines have gotten a lot better.    Review of Systems   Constitutional, HEENT, cardiovascular, pulmonary, gi and gu systems are negative, except as otherwise noted.      Objective    BP (!) 147/103   Pulse 88   Temp 97.9  F (36.6  C) (Oral)   Resp 20   Ht 1.689 m (5' 6.5\")   Wt 118.4 kg (261 lb)   SpO2 96%   BMI 41.50 kg/m    Body mass index is 41.5 kg/m .  Physical Exam   Alert and pleasant well groomed.  Freely communicative and affect is appropriate.  S1 and S2 normal, no murmurs, clicks, gallops or rubs. Regular rate and rhythm. Chest is clear; no wheezes or rales. No edema or JVD.  Past labs reviewed with the patient.                 "

## 2022-03-10 ASSESSMENT — PATIENT HEALTH QUESTIONNAIRE - PHQ9: SUM OF ALL RESPONSES TO PHQ QUESTIONS 1-9: 15

## 2022-03-15 DIAGNOSIS — M51.369 DDD (DEGENERATIVE DISC DISEASE), LUMBAR: ICD-10-CM

## 2022-03-16 RX ORDER — CYCLOBENZAPRINE HCL 10 MG
10 TABLET ORAL 3 TIMES DAILY PRN
Qty: 30 TABLET | Refills: 2 | Status: SHIPPED | OUTPATIENT
Start: 2022-03-16

## 2022-03-16 NOTE — TELEPHONE ENCOUNTER
Routing refill request to provider for review/approval because:  Drug not on the [facility] refill protocol   Joi Merida BSN, RN

## 2022-03-26 ENCOUNTER — HEALTH MAINTENANCE LETTER (OUTPATIENT)
Age: 47
End: 2022-03-26

## 2022-04-03 ENCOUNTER — TELEPHONE (OUTPATIENT)
Dept: NURSING | Facility: CLINIC | Age: 47
End: 2022-04-03
Payer: COMMERCIAL

## 2022-04-03 NOTE — TELEPHONE ENCOUNTER
Pt is calling in to request a refill of his Hydrocodone, and would like the amount increased, and if he could get a stronger dose ordered.     Please call Joel at [phone] to advise.     Berry Pires RN on 4/3/2022 at 11:48 AM

## 2022-04-04 ENCOUNTER — TELEPHONE (OUTPATIENT)
Dept: FAMILY MEDICINE | Facility: CLINIC | Age: 47
End: 2022-04-04
Payer: MEDICARE

## 2022-04-04 DIAGNOSIS — M51.369 DDD (DEGENERATIVE DISC DISEASE), LUMBAR: Primary | ICD-10-CM

## 2022-04-04 RX ORDER — GABAPENTIN 300 MG/1
CAPSULE ORAL
Qty: 60 CAPSULE | Refills: 0 | Status: SHIPPED | OUTPATIENT
Start: 2022-04-04 | End: 2022-04-13

## 2022-04-04 NOTE — TELEPHONE ENCOUNTER
Patient returned call, writer relayed message from provider below.    Patient does have upcoming appointment scheduled with primary care provider on 4/13.    Patient states he is currently in [place] and is requesting prescription be transferred to [facility] nearby. Writer instructed patient that he should be able to transfer prescription from [facility] to requested pharmacy [place].    Patient was instructed to call back if any further questions or concerns.    Mariama Ac RN  [facility]

## 2022-04-04 NOTE — TELEPHONE ENCOUNTER
I am willing to treat his chronic back issues with gabapentin or Lyrica, but not narcotics on an ongoing basis.  And he would need a visit to discuss any further narcotic pain medication.  I will send in a start a course of gabapentin to start him on and see if that helps while he is waiting to get in to see the back specialist.  That is the best I have for him

## 2022-04-04 NOTE — TELEPHONE ENCOUNTER
Called patient to inform and assist with scheduling, patient did not answer so a detailed message was left along with the number to call us back if he would like our help in making the appointment. Thank you.

## 2022-04-04 NOTE — TELEPHONE ENCOUNTER
Called patient, left message with  [place] phone number to call back.       If patient calls back, please give message per Dr. Schultz.    Dianne Wooten RN, [facility]

## 2022-04-04 NOTE — TELEPHONE ENCOUNTER
Called patient back due to concerning comments in previous message with the .    Patient states he has felt this way for 11 years or more.   He admits to self harm and suicidal ideation many times but no plans, this is not new for him.  He states he cried for 5 minutes yesterday morning trying to put on his socks due to the pain.    He states he doesn't care about addiction to pain medications compared to his pain that he lives with.  He is not worse than when he had his visit with Dr. Schultz on 3/9/22. He is safe at this moment, he gets better during the day with movement.     Discussed with patient that if he feels this severe pain and feeling like acting on self harm, to call 911 to help.  He would consider this but not fully agreeable.    Discussed following up on spine , he was awaiting a call back from the spine clinic to get the answer about radio frequency ablation.  Patient states he knows 4 people that have had worse back pain after spinal surgery.       Confirmed the phone number that was in the referral.  He has the correct one.      Also discussed pain management, he has been reading negative reviews on a few places.  Explained that his experience will be independent of the reviews he reads online.   He is willing to hear recommendations.    [place] in the [place], he is just not close to the clinic.    Routing to Dr. Schultz for review.    Dianne Wooten RN, [facility]

## 2022-04-04 NOTE — TELEPHONE ENCOUNTER
Writer assisted patient in scheduling a Med Review appointment in a Same Day slot on 4/13 when patient will be in [place].    While looking for an appointment patient made a concerning comment, that if he wasn't able to get medication [place] it will be ending up anyway.    Routing this information to the RNs for follow up.

## 2022-04-13 ENCOUNTER — OFFICE VISIT (OUTPATIENT)
Dept: FAMILY MEDICINE | Facility: CLINIC | Age: 47
End: 2022-04-13
Payer: COMMERCIAL

## 2022-04-13 VITALS
BODY MASS INDEX: 40.65 KG/M2 | DIASTOLIC BLOOD PRESSURE: 90 MMHG | HEART RATE: 97 BPM | WEIGHT: 259 LBS | TEMPERATURE: 98.3 F | HEIGHT: 67 IN | OXYGEN SATURATION: 98 % | SYSTOLIC BLOOD PRESSURE: 130 MMHG | RESPIRATION RATE: 16 BRPM

## 2022-04-13 DIAGNOSIS — K21.9 GASTROESOPHAGEAL REFLUX DISEASE WITHOUT ESOPHAGITIS: ICD-10-CM

## 2022-04-13 DIAGNOSIS — F33.1 MODERATE EPISODE OF RECURRENT MAJOR DEPRESSIVE DISORDER (H): ICD-10-CM

## 2022-04-13 DIAGNOSIS — M51.369 DDD (DEGENERATIVE DISC DISEASE), LUMBAR: Primary | ICD-10-CM

## 2022-04-13 DIAGNOSIS — I10 HYPERTENSION GOAL BP (BLOOD PRESSURE) < 140/90: ICD-10-CM

## 2022-04-13 PROCEDURE — 99214 OFFICE O/P EST MOD 30 MIN: CPT | Performed by: FAMILY MEDICINE

## 2022-04-13 RX ORDER — OMEPRAZOLE 40 MG/1
40 CAPSULE, DELAYED RELEASE ORAL DAILY
Qty: 30 CAPSULE | Refills: 2 | Status: SHIPPED | OUTPATIENT
Start: 2022-04-13

## 2022-04-13 RX ORDER — PREGABALIN 75 MG/1
75 CAPSULE ORAL 2 TIMES DAILY
Qty: 60 CAPSULE | Refills: 0 | Status: SHIPPED | OUTPATIENT
Start: 2022-04-13

## 2022-04-13 RX ORDER — CLONIDINE HYDROCHLORIDE 0.2 MG/1
0.2 TABLET ORAL 2 TIMES DAILY
Qty: 30 TABLET | Refills: 2 | Status: SHIPPED | OUTPATIENT
Start: 2022-04-13

## 2022-04-13 ASSESSMENT — PAIN SCALES - GENERAL: PAINLEVEL: SEVERE PAIN (7)

## 2022-04-13 ASSESSMENT — PATIENT HEALTH QUESTIONNAIRE - PHQ9
SUM OF ALL RESPONSES TO PHQ QUESTIONS 1-9: 19
10. IF YOU CHECKED OFF ANY PROBLEMS, HOW DIFFICULT HAVE THESE PROBLEMS MADE IT FOR YOU TO DO YOUR WORK, TAKE CARE OF THINGS AT HOME, OR GET ALONG WITH OTHER PEOPLE: SOMEWHAT DIFFICULT
SUM OF ALL RESPONSES TO PHQ QUESTIONS 1-9: 19

## 2022-04-13 NOTE — PROGRESS NOTES
Assessment & Plan     DDD (degenerative disc disease), lumbar  Longstanding issue.  I first met patient a few months ago but I am able to review records from many years ago.  Has had injections and radiofrequency ablation in the past that has been somewhat successful.  Has been on numerous medications including amitriptyline, Cymbalta, gabapentin without much success.  [place] in [place] and [facility] does have a [facility] office in [place].  Is looking to get a referral there to look at injections and/or radiofrequency ablation and I think this is reasonable.  We will need an updated MRI ahead of time.  As it relates to overall pain management I discussed with the patient that I do not think he is a candidate for narcotic therapy, especially not through me.  That may be something he discusses with the pain management clinic.  Gabapentin is not doing much and we can change to Lyrica and see if that helps a bit.  - pregabalin (LYRICA) 75 MG capsule; Take 1 capsule (75 mg) by mouth 2 times daily  - MR Lumbar Spine w/o Contrast; Future  - Pain Management Referral; Future    Hypertension goal BP (blood pressure) < 140/90  Newly at goal.  Will increase clonidine 0.2 mg twice daily  - cloNIDine (CATAPRES) 0.2 MG tablet; Take 1 tablet (0.2 mg) by mouth 2 times daily    Moderate episode of recurrent major depressive disorder (H)  Longstanding recurrent issue and we reviewed his PHQ-9 score including suicidal ideation.  Says this is vague with no plans but he is very tired of his back pain.  In the past he has carried diagnoses depression as well as schizophrenia though does not seem to fit with that.  Does not want to be on any type of therapy currently.    Gastroesophageal reflux disease without esophagitis  Discussed mechanism of action of the proposed medication, as well as potential effects, both good and bad.  Patient expressed understanding and agreed with  treatment.   - omeprazole (PRILOSEC) 40 MG DR capsule; Take 1 capsule (40 mg) by mouth daily    Reviewed previous history extensively through care everywhere     Depression Screening Follow Up    PHQ 4/13/2022   PHQ-9 Total Score 19   Q9: Thoughts of better off dead/self-harm past 2 weeks Nearly every day   F/U: Thoughts of suicide or self-harm Yes   F/U: Self harm-plan Yes   F/U: Self-harm action No   F/U: Safety concerns No     Reviewed scores as above    See Patient Instructions    Return in about 2 weeks (around 4/27/2022) for Contact me with [place] message.    Padma Schultz MD  [facility] NOHEMI Maldonado is a 46 year old who presents for the following health issues  accompanied by his self.    Heartburn would like omeprazole if possible    History of Present Illness       Back Pain:  He presents for follow up of back pain. Patient's back pain is a chronic problem.  Location of back pain:  Right lower back and left lower back  Description of back pain: sharp, shooting and stabbing  Back pain spreads: nowhere    Since patient first noticed back pain, pain is: unchanged  Does back pain interfere with his job:  Not applicable      Mental Health Follow-up:                    Today's PHQ-9         PHQ-9 Total Score: 19  PHQ-9 Q9 Thoughts of better off dead/self-harm past 2 weeks :   (P) Nearly every day  Thoughts of suicide or self harm: (P) Yes  Self-harm Plan:   (P) Yes  Self-harm Action:     (P) No  Safety concerns for self or others: (P) No    How difficult have these problems made it for you to do your work, take care of things at home, or get along with other people: Somewhat difficult        He eats 0-1 servings of fruits and vegetables daily.He consumes 4 sweetened beverage(s) daily.He exercises with enough effort to increase his heart rate 9 or less minutes per day.  He exercises with enough effort to increase his heart rate 3 or less days per week. He is  "missing 1 dose(s) of medications per week.       Here today to follow-up on hypertension chronic back issues.    Review of Systems   Constitutional, HEENT, cardiovascular, pulmonary, gi and gu systems are negative, except as otherwise noted.      Objective    BP (!) 130/90   Pulse 97   Temp 98.3  F (36.8  C) (Tympanic)   Resp 16   Ht 1.689 m (5' 6.5\")   Wt 117.5 kg (259 lb)   SpO2 98%   BMI 41.18 kg/m    Body mass index is 41.18 kg/m .  Physical Exam   Alert, pleasant, upbeat, and in no apparent discomfort.  Affect is full range of motion and is not flat at all  S1 and S2 normal, no murmurs, clicks, gallops or rubs. Regular rate and rhythm. Chest is clear; no wheezes or rales. No edema or JVD.    Past labs reviewed with the patient.                 "

## 2022-04-13 NOTE — PATIENT INSTRUCTIONS
Plan:    1) contact [facility] to set up consultation.  I will fax the referral    2) before seeing them we will get an MRI ([phone])    3) stop the gabapentin -I really think it is doing much.  Let's try Lyrica - not related to any of the other medicines the blood had you on in the past.  Let me know in a couple of weeks we can certainly increase dosage, etc. if need be    4) I am also sending in refills of the blood pressure medication.  Refill the clonidine it will be slightly higher and that should take care of everything.

## 2022-04-14 ASSESSMENT — PATIENT HEALTH QUESTIONNAIRE - PHQ9: SUM OF ALL RESPONSES TO PHQ QUESTIONS 1-9: 19

## 2022-04-18 ENCOUNTER — ANCILLARY PROCEDURE (OUTPATIENT)
Dept: MRI IMAGING | Facility: CLINIC | Age: 47
End: 2022-04-18
Attending: FAMILY MEDICINE
Payer: COMMERCIAL

## 2022-04-18 DIAGNOSIS — M51.369 DDD (DEGENERATIVE DISC DISEASE), LUMBAR: ICD-10-CM

## 2022-04-18 PROCEDURE — 72148 MRI LUMBAR SPINE W/O DYE: CPT | Performed by: RADIOLOGY

## 2022-04-18 NOTE — RESULT ENCOUNTER NOTE
Joel,  Here is a copy of that MRI report.  There does look like there is quite a bit of narrowing and nerve pinching near the lower part of the lumbar area.  I think this is exactly what an injection can help.  HARRIS Schultz M.D.

## 2022-09-17 ENCOUNTER — HEALTH MAINTENANCE LETTER (OUTPATIENT)
Age: 47
End: 2022-09-17

## 2023-01-23 ENCOUNTER — HEALTH MAINTENANCE LETTER (OUTPATIENT)
Age: 48
End: 2023-01-23

## 2024-02-24 ENCOUNTER — HEALTH MAINTENANCE LETTER (OUTPATIENT)
Age: 49
End: 2024-02-24

## 2025-07-21 ENCOUNTER — PATIENT OUTREACH (OUTPATIENT)
Dept: CARE COORDINATION | Facility: CLINIC | Age: 50
End: 2025-07-21
Payer: COMMERCIAL

## 2025-08-04 ENCOUNTER — PATIENT OUTREACH (OUTPATIENT)
Dept: CARE COORDINATION | Facility: CLINIC | Age: 50
End: 2025-08-04
Payer: COMMERCIAL